# Patient Record
Sex: MALE | Race: WHITE | NOT HISPANIC OR LATINO | Employment: UNEMPLOYED | URBAN - NONMETROPOLITAN AREA
[De-identification: names, ages, dates, MRNs, and addresses within clinical notes are randomized per-mention and may not be internally consistent; named-entity substitution may affect disease eponyms.]

---

## 2017-04-08 ENCOUNTER — OFFICE VISIT (OUTPATIENT)
Dept: RETAIL CLINIC | Facility: CLINIC | Age: 22
End: 2017-04-08

## 2017-04-08 VITALS — RESPIRATION RATE: 18 BRPM | TEMPERATURE: 99 F | HEART RATE: 105 BPM | OXYGEN SATURATION: 98 %

## 2017-04-08 DIAGNOSIS — J02.9 ACUTE PHARYNGITIS, UNSPECIFIED ETIOLOGY: Primary | ICD-10-CM

## 2017-04-08 LAB
EXPIRATION DATE: NORMAL
INTERNAL CONTROL: NORMAL
Lab: NORMAL
S PYO AG THROAT QL: NEGATIVE

## 2017-04-08 PROCEDURE — 99202 OFFICE O/P NEW SF 15 MIN: CPT | Performed by: NURSE PRACTITIONER

## 2017-04-08 PROCEDURE — 87880 STREP A ASSAY W/OPTIC: CPT | Performed by: NURSE PRACTITIONER

## 2017-04-08 RX ORDER — AMOXICILLIN AND CLAVULANATE POTASSIUM 875; 125 MG/1; MG/1
1 TABLET, FILM COATED ORAL 2 TIMES DAILY
Qty: 20 TABLET | Refills: 0 | Status: SHIPPED | OUTPATIENT
Start: 2017-04-08 | End: 2017-04-18

## 2017-04-08 NOTE — PROGRESS NOTES
Subjective   Perez Choi is a 21 y.o. male.     History of Present Illness   Perez Choi presents today with complaints of sore throat and cough for the past 3 days.  He reports painful swallowing. Low grade fever today.  Cough is productive of yellow-green phlegm.  Some nasal congestion, no runny nose.  He has been taking Ibuprofen and Tylenol.    The following portions of the patient's history were reviewed and updated as appropriate: allergies, current medications, past family history, past medical history, past social history, past surgical history and problem list.    Review of Systems   Constitutional: Positive for fever.   HENT: Positive for congestion, sore throat and trouble swallowing (painful). Negative for postnasal drip and rhinorrhea.    Respiratory: Positive for cough. Negative for shortness of breath and wheezing.    Musculoskeletal: Negative.    Neurological: Negative.        Objective   Physical Exam   Constitutional: He is oriented to person, place, and time. He appears well-developed and well-nourished.   HENT:   Right Ear: Tympanic membrane and ear canal normal.   Left Ear: Tympanic membrane and ear canal normal.   Nose: Nose normal.   Mouth/Throat: Uvula is midline and mucous membranes are normal. Oropharyngeal exudate, posterior oropharyngeal edema and posterior oropharyngeal erythema present. Tonsils are 3+ on the right. Tonsils are 3+ on the left. Tonsillar exudate.   Cardiovascular: Regular rhythm and normal heart sounds.    Pulmonary/Chest: Effort normal and breath sounds normal. He has no wheezes. He has no rhonchi.   Lymphadenopathy:        Head (right side): Tonsillar adenopathy present.        Head (left side): Tonsillar adenopathy present.   Neurological: He is alert and oriented to person, place, and time.   Skin: Skin is warm and dry.   Vitals reviewed.      Assessment/Plan   Perez was seen today for sore throat and cough.    Diagnoses and all orders for this  visit:    Acute pharyngitis, unspecified etiology  -     POC Rapid Strep A    Other orders  -     amoxicillin-clavulanate (AUGMENTIN) 875-125 MG per tablet; Take 1 tablet by mouth 2 (Two) Times a Day for 10 days.        Rapid strep negative.  Will treat based on presentation.  If symptoms are not improving or worsen in next 48-72 hours, follow up with PCP.

## 2017-10-30 ENCOUNTER — OFFICE VISIT (OUTPATIENT)
Dept: PRIMARY CARE CLINIC | Age: 22
End: 2017-10-30
Payer: COMMERCIAL

## 2017-10-30 VITALS
HEART RATE: 106 BPM | DIASTOLIC BLOOD PRESSURE: 90 MMHG | SYSTOLIC BLOOD PRESSURE: 132 MMHG | RESPIRATION RATE: 20 BRPM | HEIGHT: 68 IN | TEMPERATURE: 97.8 F | BODY MASS INDEX: 47.74 KG/M2 | WEIGHT: 315 LBS

## 2017-10-30 DIAGNOSIS — I10 ESSENTIAL HYPERTENSION: ICD-10-CM

## 2017-10-30 DIAGNOSIS — F41.9 ANXIETY: Primary | ICD-10-CM

## 2017-10-30 PROCEDURE — 99203 OFFICE O/P NEW LOW 30 MIN: CPT | Performed by: FAMILY MEDICINE

## 2017-10-30 RX ORDER — LISINOPRIL 10 MG/1
10 TABLET ORAL DAILY
Qty: 30 TABLET | Refills: 3 | Status: SHIPPED | OUTPATIENT
Start: 2017-10-30 | End: 2017-12-12 | Stop reason: SDUPTHER

## 2017-11-02 ASSESSMENT — ENCOUNTER SYMPTOMS
COLOR CHANGE: 0
DIARRHEA: 0
VOMITING: 0
RHINORRHEA: 0
NAUSEA: 0
CONSTIPATION: 0
ABDOMINAL PAIN: 0
COUGH: 0

## 2017-12-12 ENCOUNTER — OFFICE VISIT (OUTPATIENT)
Dept: PRIMARY CARE CLINIC | Age: 22
End: 2017-12-12
Payer: COMMERCIAL

## 2017-12-12 VITALS
RESPIRATION RATE: 20 BRPM | HEART RATE: 84 BPM | WEIGHT: 315 LBS | SYSTOLIC BLOOD PRESSURE: 142 MMHG | OXYGEN SATURATION: 98 % | TEMPERATURE: 98.1 F | DIASTOLIC BLOOD PRESSURE: 90 MMHG | HEIGHT: 68 IN | BODY MASS INDEX: 47.74 KG/M2

## 2017-12-12 DIAGNOSIS — I10 ESSENTIAL HYPERTENSION: Primary | ICD-10-CM

## 2017-12-12 DIAGNOSIS — Z00.00 WELLNESS EXAMINATION: ICD-10-CM

## 2017-12-12 DIAGNOSIS — Z13.220 SCREENING, LIPID: ICD-10-CM

## 2017-12-12 DIAGNOSIS — F32.A ANXIETY AND DEPRESSION: ICD-10-CM

## 2017-12-12 DIAGNOSIS — F41.9 ANXIETY AND DEPRESSION: ICD-10-CM

## 2017-12-12 LAB
ALBUMIN SERPL-MCNC: 4.5 G/DL (ref 3.5–5.2)
ALP BLD-CCNC: 109 U/L (ref 40–130)
ALT SERPL-CCNC: 33 U/L (ref 5–41)
ANION GAP SERPL CALCULATED.3IONS-SCNC: 15 MMOL/L (ref 7–19)
AST SERPL-CCNC: 22 U/L (ref 5–40)
BASOPHILS ABSOLUTE: 0 K/UL (ref 0–0.2)
BASOPHILS RELATIVE PERCENT: 0.5 % (ref 0–1)
BILIRUB SERPL-MCNC: 0.5 MG/DL (ref 0.2–1.2)
BUN BLDV-MCNC: 10 MG/DL (ref 6–20)
CALCIUM SERPL-MCNC: 9.3 MG/DL (ref 8.6–10)
CHLORIDE BLD-SCNC: 101 MMOL/L (ref 98–111)
CHOLESTEROL, TOTAL: 142 MG/DL (ref 160–199)
CO2: 24 MMOL/L (ref 22–29)
CREAT SERPL-MCNC: 0.5 MG/DL (ref 0.5–1.2)
EOSINOPHILS ABSOLUTE: 0.3 K/UL (ref 0–0.6)
EOSINOPHILS RELATIVE PERCENT: 3.2 % (ref 0–5)
GFR NON-AFRICAN AMERICAN: >60
GLUCOSE BLD-MCNC: 92 MG/DL (ref 74–109)
HCT VFR BLD CALC: 48 % (ref 42–52)
HDLC SERPL-MCNC: 31 MG/DL (ref 55–121)
HEMOGLOBIN: 15.7 G/DL (ref 14–18)
LDL CHOLESTEROL CALCULATED: 82 MG/DL
LYMPHOCYTES ABSOLUTE: 2.1 K/UL (ref 1.1–4.5)
LYMPHOCYTES RELATIVE PERCENT: 26.8 % (ref 20–40)
MCH RBC QN AUTO: 28.4 PG (ref 27–31)
MCHC RBC AUTO-ENTMCNC: 32.7 G/DL (ref 33–37)
MCV RBC AUTO: 87 FL (ref 80–94)
MONOCYTES ABSOLUTE: 0.5 K/UL (ref 0–0.9)
MONOCYTES RELATIVE PERCENT: 6.2 % (ref 0–10)
NEUTROPHILS ABSOLUTE: 4.9 K/UL (ref 1.5–7.5)
NEUTROPHILS RELATIVE PERCENT: 63 % (ref 50–65)
PDW BLD-RTO: 12.4 % (ref 11.5–14.5)
PLATELET # BLD: 283 K/UL (ref 130–400)
PMV BLD AUTO: 9.8 FL (ref 9.4–12.4)
POTASSIUM SERPL-SCNC: 4.3 MMOL/L (ref 3.5–5)
RBC # BLD: 5.52 M/UL (ref 4.7–6.1)
SODIUM BLD-SCNC: 140 MMOL/L (ref 136–145)
TOTAL PROTEIN: 7.5 G/DL (ref 6.6–8.7)
TRIGL SERPL-MCNC: 144 MG/DL (ref 0–149)
TSH SERPL DL<=0.05 MIU/L-ACNC: 2.69 UIU/ML (ref 0.27–4.2)
WBC # BLD: 7.8 K/UL (ref 4.8–10.8)

## 2017-12-12 PROCEDURE — 36415 COLL VENOUS BLD VENIPUNCTURE: CPT | Performed by: FAMILY MEDICINE

## 2017-12-12 PROCEDURE — 99214 OFFICE O/P EST MOD 30 MIN: CPT | Performed by: FAMILY MEDICINE

## 2017-12-12 RX ORDER — LISINOPRIL 20 MG/1
20 TABLET ORAL DAILY
Qty: 30 TABLET | Refills: 5 | Status: SHIPPED | OUTPATIENT
Start: 2017-12-12 | End: 2019-06-17 | Stop reason: SDUPTHER

## 2017-12-12 RX ORDER — SERTRALINE HYDROCHLORIDE 100 MG/1
100 TABLET, FILM COATED ORAL DAILY
Qty: 30 TABLET | Refills: 5 | Status: SHIPPED | OUTPATIENT
Start: 2017-12-12 | End: 2018-10-02 | Stop reason: ALTCHOICE

## 2017-12-12 ASSESSMENT — ENCOUNTER SYMPTOMS
VOMITING: 0
CONSTIPATION: 0
COLOR CHANGE: 0
COUGH: 0
ABDOMINAL PAIN: 0
NAUSEA: 0
DIARRHEA: 0
RHINORRHEA: 0

## 2017-12-12 NOTE — PROGRESS NOTES
Subjective:      Patient ID: Micheline Hawkins is a 25 y.o. male. HPI  Patient presented today for a six-week checkup on his blood pressure. He states that he is taking his blood pressure medication regularly. He states that his not checking his blood pressure at home. He states that he is not having any headaches. He is tolerating the medication well. He states that the Zoloft seems to be helping with his anxiety but he stills feels like he is down. Mother states that he does not have a lot of get up and go. She states that he feels like he is down there has been some changes at home that she thinks may be leading to this. He is tolerating the Zoloft well. Past Medical History:   Diagnosis Date    Anxiety     Hypertension     Obesity      No current outpatient prescriptions on file prior to visit. No current facility-administered medications on file prior to visit. No Known Allergies    Review of Systems   Constitutional: Negative for activity change, appetite change and fatigue. HENT: Negative for congestion and rhinorrhea. Eyes: Negative for visual disturbance. Respiratory: Negative for cough. Cardiovascular: Negative for chest pain and palpitations. Gastrointestinal: Negative for abdominal pain, constipation, diarrhea, nausea and vomiting. Genitourinary: Negative for decreased urine volume and difficulty urinating. Musculoskeletal: Negative for arthralgias. Skin: Negative for color change and rash. Allergic/Immunologic: Negative for immunocompromised state. Neurological: Negative for seizures and headaches. Hematological: Does not bruise/bleed easily. Psychiatric/Behavioral: Positive for dysphoric mood. Negative for agitation and sleep disturbance. The patient is nervous/anxious. Objective:   Physical Exam   Constitutional: He is oriented to person, place, and time. He appears well-developed and well-nourished. No distress.    HENT:   Head: Normocephalic and atraumatic. Neck: Normal range of motion. Neck supple. Cardiovascular: Normal rate, regular rhythm, normal heart sounds and intact distal pulses. Pulmonary/Chest: Effort normal and breath sounds normal. No respiratory distress. He has no wheezes. Neurological: He is alert and oriented to person, place, and time. Skin: Skin is warm and dry. No rash noted. He is not diaphoretic. Psychiatric: He has a normal mood and affect. His behavior is normal. Judgment and thought content normal.     BP (!) 142/90   Pulse 84   Temp 98.1 °F (36.7 °C) (Temporal)   Resp 20   Ht 5' 8\" (1.727 m)   Wt (!) 326 lb (147.9 kg)   SpO2 98%   BMI 49.57 kg/m²     Assessment:        ICD-10-CM ICD-9-CM    1. Essential hypertension I10 401.9 CBC Auto Differential      Comprehensive Metabolic Panel      Lipid Panel      TSH without Reflex   2. Anxiety and depression F41.8 300.00      311    3. Screening, lipid Z13.220 V77.91 Lipid Panel   4. Wellness examination Z00.00 V70.0 CBC Auto Differential      Comprehensive Metabolic Panel      Lipid Panel      TSH without Reflex            Plan:      Continue current medications. Increase lisinopril to 20 mg daily. We are also going to increase Zoloft to 100 mg daily. He is to check his blood pressure at home and call us if his blood pressure is staying higher than 130/80.   Follow up in 6 weeks for check up

## 2018-10-02 ENCOUNTER — HOSPITAL ENCOUNTER (EMERGENCY)
Age: 23
Discharge: HOME | End: 2018-10-03
Payer: COMMERCIAL

## 2018-10-02 ENCOUNTER — APPOINTMENT (OUTPATIENT)
Dept: GENERAL RADIOLOGY | Age: 23
End: 2018-10-02
Payer: COMMERCIAL

## 2018-10-02 DIAGNOSIS — R00.0 TACHYCARDIA: Primary | ICD-10-CM

## 2018-10-02 DIAGNOSIS — I10 ESSENTIAL HYPERTENSION: ICD-10-CM

## 2018-10-02 LAB
ALBUMIN SERPL-MCNC: 4.4 G/DL (ref 3.5–5.2)
ALP BLD-CCNC: 121 U/L (ref 40–130)
ALT SERPL-CCNC: 33 U/L (ref 5–41)
AMPHETAMINE SCREEN, URINE: NEGATIVE
ANION GAP SERPL CALCULATED.3IONS-SCNC: 14 MMOL/L (ref 7–19)
AST SERPL-CCNC: 24 U/L (ref 5–40)
BARBITURATE SCREEN URINE: NEGATIVE
BENZODIAZEPINE SCREEN, URINE: NEGATIVE
BILIRUB SERPL-MCNC: 0.3 MG/DL (ref 0.2–1.2)
BILIRUBIN URINE: NEGATIVE
BLOOD, URINE: NEGATIVE
BUN BLDV-MCNC: 14 MG/DL (ref 6–20)
CALCIUM SERPL-MCNC: 9.2 MG/DL (ref 8.6–10)
CANNABINOID SCREEN URINE: NEGATIVE
CHLORIDE BLD-SCNC: 101 MMOL/L (ref 98–111)
CLARITY: CLEAR
CO2: 22 MMOL/L (ref 22–29)
COCAINE METABOLITE SCREEN URINE: NEGATIVE
COLOR: YELLOW
CREAT SERPL-MCNC: 0.6 MG/DL (ref 0.5–1.2)
GFR NON-AFRICAN AMERICAN: >60
GLUCOSE BLD-MCNC: 116 MG/DL (ref 74–109)
GLUCOSE URINE: NEGATIVE MG/DL
KETONES, URINE: NEGATIVE MG/DL
LEUKOCYTE ESTERASE, URINE: NEGATIVE
Lab: NORMAL
NITRITE, URINE: NEGATIVE
OPIATE SCREEN URINE: NEGATIVE
PH UA: 5.5
POTASSIUM SERPL-SCNC: 4.7 MMOL/L (ref 3.5–5)
PROTEIN UA: NEGATIVE MG/DL
SODIUM BLD-SCNC: 137 MMOL/L (ref 136–145)
SPECIFIC GRAVITY UA: 1.03
TOTAL PROTEIN: 7.5 G/DL (ref 6.6–8.7)
TROPONIN: <0.01 NG/ML (ref 0–0.03)
TSH SERPL DL<=0.05 MIU/L-ACNC: 3.62 UIU/ML (ref 0.27–4.2)
URINE REFLEX TO CULTURE: NORMAL
UROBILINOGEN, URINE: 0.2 E.U./DL

## 2018-10-02 PROCEDURE — 81003 URINALYSIS AUTO W/O SCOPE: CPT

## 2018-10-02 PROCEDURE — 84443 ASSAY THYROID STIM HORMONE: CPT

## 2018-10-02 PROCEDURE — 80307 DRUG TEST PRSMV CHEM ANLYZR: CPT

## 2018-10-02 PROCEDURE — 80053 COMPREHEN METABOLIC PANEL: CPT

## 2018-10-02 PROCEDURE — 36415 COLL VENOUS BLD VENIPUNCTURE: CPT

## 2018-10-02 PROCEDURE — 99285 EMERGENCY DEPT VISIT HI MDM: CPT

## 2018-10-02 PROCEDURE — 84484 ASSAY OF TROPONIN QUANT: CPT

## 2018-10-02 PROCEDURE — 2580000003 HC RX 258: Performed by: NURSE PRACTITIONER

## 2018-10-02 PROCEDURE — 71046 X-RAY EXAM CHEST 2 VIEWS: CPT

## 2018-10-02 PROCEDURE — 93005 ELECTROCARDIOGRAM TRACING: CPT

## 2018-10-02 PROCEDURE — 85025 COMPLETE CBC W/AUTO DIFF WBC: CPT

## 2018-10-02 RX ORDER — 0.9 % SODIUM CHLORIDE 0.9 %
1000 INTRAVENOUS SOLUTION INTRAVENOUS ONCE
Status: COMPLETED | OUTPATIENT
Start: 2018-10-02 | End: 2018-10-02

## 2018-10-02 RX ORDER — PROPRANOLOL HCL 60 MG
60 CAPSULE, EXTENDED RELEASE 24HR ORAL DAILY
COMMUNITY
End: 2019-06-17 | Stop reason: SDUPTHER

## 2018-10-02 RX ORDER — TRAZODONE HYDROCHLORIDE 50 MG/1
50 TABLET ORAL NIGHTLY
COMMUNITY
End: 2019-06-17 | Stop reason: ALTCHOICE

## 2018-10-02 RX ORDER — LISINOPRIL 10 MG/1
20 TABLET ORAL ONCE
Status: DISCONTINUED | OUTPATIENT
Start: 2018-10-02 | End: 2018-10-03 | Stop reason: HOSPADM

## 2018-10-02 RX ADMIN — SODIUM CHLORIDE 1000 ML: 9 INJECTION, SOLUTION INTRAVENOUS at 22:00

## 2018-10-03 VITALS
DIASTOLIC BLOOD PRESSURE: 77 MMHG | HEART RATE: 94 BPM | TEMPERATURE: 98 F | RESPIRATION RATE: 16 BRPM | OXYGEN SATURATION: 96 % | WEIGHT: 295 LBS | HEIGHT: 68 IN | SYSTOLIC BLOOD PRESSURE: 135 MMHG | BODY MASS INDEX: 44.71 KG/M2

## 2018-10-03 LAB
BASOPHILS ABSOLUTE: 0.1 K/UL (ref 0–0.2)
BASOPHILS RELATIVE PERCENT: 0.6 % (ref 0–1)
EOSINOPHILS ABSOLUTE: 0.2 K/UL (ref 0–0.6)
EOSINOPHILS RELATIVE PERCENT: 1.5 % (ref 0–5)
HCT VFR BLD CALC: 47.3 % (ref 42–52)
HEMOGLOBIN: 15.7 G/DL (ref 14–18)
LYMPHOCYTES ABSOLUTE: 2.6 K/UL (ref 1.1–4.5)
LYMPHOCYTES RELATIVE PERCENT: 26.7 % (ref 20–40)
MCH RBC QN AUTO: 28.5 PG (ref 27–31)
MCHC RBC AUTO-ENTMCNC: 33.2 G/DL (ref 33–37)
MCV RBC AUTO: 86 FL (ref 80–94)
MONOCYTES ABSOLUTE: 0.8 K/UL (ref 0–0.9)
MONOCYTES RELATIVE PERCENT: 7.8 % (ref 0–10)
NEUTROPHILS ABSOLUTE: 6.2 K/UL (ref 1.5–7.5)
NEUTROPHILS RELATIVE PERCENT: 63.2 % (ref 50–65)
PDW BLD-RTO: 12.1 % (ref 11.5–14.5)
PLATELET # BLD: 277 K/UL (ref 130–400)
PLATELET SLIDE REVIEW: NORMAL
PMV BLD AUTO: 10 FL (ref 9.4–12.4)
RBC # BLD: 5.5 M/UL (ref 4.7–6.1)
WBC # BLD: 9.8 K/UL (ref 4.8–10.8)

## 2018-10-03 PROCEDURE — 99283 EMERGENCY DEPT VISIT LOW MDM: CPT | Performed by: NURSE PRACTITIONER

## 2018-10-03 ASSESSMENT — ENCOUNTER SYMPTOMS
COUGH: 0
DIARRHEA: 0
SORE THROAT: 0
ABDOMINAL PAIN: 0
VOMITING: 0
BACK PAIN: 0
NAUSEA: 0
WHEEZING: 0
SHORTNESS OF BREATH: 0
EYE DISCHARGE: 0

## 2018-10-03 NOTE — ED PROVIDER NOTES
Result Value    Glucose 116 (*)     All other components within normal limits   CBC WITH AUTO DIFFERENTIAL   TSH WITHOUT REFLEX   URINE RT REFLEX TO CULTURE   URINE DRUG SCREEN   TROPONIN     EKG shows a sinus tachycardia heart rate of 131 without any evidence of ST elevation depression or T-wave abnormality. All other labs were within normal range or not returned as of this dictation. RE-ASSESSMENT          EMERGENCY DEPARTMENT COURSE and DIFFERENTIAL DIAGNOSIS/MDM:   Vitals:    Vitals:    10/02/18 2139 10/02/18 2231 10/02/18 2302 10/03/18 0002   BP: (!) 146/90  (!) 140/82 135/77   Pulse: 94 99 101 94   Resp:    16   Temp:    98 °F (36.7 °C)   TempSrc:    Temporal   SpO2:  96% 96% 96%   Weight:       Height:               MDM  Number of Diagnoses or Management Options  Essential hypertension:   Tachycardia:   Diagnosis management comments: Patient presents to the ED tonight with tachycardia as an known hypertension. Patient was recently placed on propanolol for his tachycardia by his primary care yesterday. He reports he has had a lot of stress and he has been under treatment as amount of stress recently however he denies any SI or any HI. During this course ED course he had independently reduced his blood pressure and heart rate however he tells me he took his propanolol 2 hours prior to presenting to the ED. At this time we did discuss hypertension and the need to monitor his blood pressures frequently. He will call his primary care tomorrow to inquire if he should still be taken his lisinopril with his propanolol. We are going to provide him with outpatient information for our crisis center as well as counseling centers in the area. At this time the patient tells me he feels he is 21 and not completed any college and feels that he has not filled his potential and this is what he is stressing over however again he denies any SI or HI.  At this time the patient is hemodynamically stable he is not

## 2018-10-04 LAB
EKG P AXIS: 36 DEGREES
EKG P-R INTERVAL: 158 MS
EKG Q-T INTERVAL: 296 MS
EKG QRS DURATION: 96 MS
EKG QTC CALCULATION (BAZETT): 420 MS
EKG T AXIS: 20 DEGREES

## 2019-06-17 ENCOUNTER — OFFICE VISIT (OUTPATIENT)
Dept: PRIMARY CARE CLINIC | Age: 24
End: 2019-06-17
Payer: COMMERCIAL

## 2019-06-17 VITALS
DIASTOLIC BLOOD PRESSURE: 110 MMHG | RESPIRATION RATE: 16 BRPM | TEMPERATURE: 98.9 F | SYSTOLIC BLOOD PRESSURE: 130 MMHG | WEIGHT: 315 LBS | HEART RATE: 130 BPM | OXYGEN SATURATION: 98 % | HEIGHT: 68 IN | BODY MASS INDEX: 47.74 KG/M2

## 2019-06-17 DIAGNOSIS — R53.83 FATIGUE, UNSPECIFIED TYPE: ICD-10-CM

## 2019-06-17 DIAGNOSIS — R40.0 DAYTIME SOMNOLENCE: ICD-10-CM

## 2019-06-17 DIAGNOSIS — I10 ESSENTIAL HYPERTENSION: ICD-10-CM

## 2019-06-17 DIAGNOSIS — E66.01 MORBID OBESITY WITH BMI OF 50.0-59.9, ADULT (HCC): Primary | ICD-10-CM

## 2019-06-17 DIAGNOSIS — R06.83 SNORING: ICD-10-CM

## 2019-06-17 PROCEDURE — 99214 OFFICE O/P EST MOD 30 MIN: CPT | Performed by: FAMILY MEDICINE

## 2019-06-17 RX ORDER — HYDROCHLOROTHIAZIDE 12.5 MG/1
12.5 CAPSULE, GELATIN COATED ORAL EVERY MORNING
Qty: 30 CAPSULE | Refills: 5 | Status: SHIPPED | OUTPATIENT
Start: 2019-06-17 | End: 2020-11-05

## 2019-06-17 RX ORDER — PROPRANOLOL HCL 60 MG
60 CAPSULE, EXTENDED RELEASE 24HR ORAL DAILY
Qty: 30 CAPSULE | Refills: 5 | Status: SHIPPED | OUTPATIENT
Start: 2019-06-17

## 2019-06-17 RX ORDER — ESCITALOPRAM OXALATE 10 MG/1
10 TABLET ORAL DAILY
Qty: 30 TABLET | Refills: 3 | Status: SHIPPED | OUTPATIENT
Start: 2019-06-17 | End: 2020-11-19 | Stop reason: SDUPTHER

## 2019-06-17 RX ORDER — LISINOPRIL 40 MG/1
40 TABLET ORAL DAILY
Qty: 30 TABLET | Refills: 5 | Status: SHIPPED | OUTPATIENT
Start: 2019-06-17

## 2019-06-17 ASSESSMENT — ENCOUNTER SYMPTOMS
VOMITING: 0
CONSTIPATION: 0
NAUSEA: 0
ABDOMINAL PAIN: 0
COUGH: 0
RHINORRHEA: 0
COLOR CHANGE: 0
DIARRHEA: 0

## 2019-06-18 RX ORDER — HYDROCHLOROTHIAZIDE 12.5 MG/1
12.5 CAPSULE, GELATIN COATED ORAL
COMMUNITY
Start: 2019-06-17

## 2019-06-18 RX ORDER — TRAZODONE HYDROCHLORIDE 50 MG/1
50 TABLET ORAL
COMMUNITY

## 2019-06-18 RX ORDER — PROPRANOLOL HCL 60 MG
60 CAPSULE, EXTENDED RELEASE 24HR ORAL
COMMUNITY

## 2019-06-18 RX ORDER — LISINOPRIL 40 MG/1
40 TABLET ORAL
COMMUNITY
Start: 2019-06-17

## 2019-06-18 NOTE — PROGRESS NOTES
Constitutional: Positive for fatigue. Negative for activity change and appetite change. HENT: Negative for congestion and rhinorrhea. Eyes: Negative for visual disturbance. Respiratory: Negative for cough. Cardiovascular: Negative for chest pain and palpitations. Gastrointestinal: Negative for abdominal pain, constipation, diarrhea, nausea and vomiting. Genitourinary: Negative for decreased urine volume and difficulty urinating. Musculoskeletal: Negative for arthralgias. Skin: Negative for color change and rash. Allergic/Immunologic: Negative for immunocompromised state. Neurological: Positive for headaches. Negative for seizures. Hematological: Does not bruise/bleed easily. Psychiatric/Behavioral: Negative for agitation and sleep disturbance. OBJECTIVE:     Physical Exam   Constitutional: He is oriented to person, place, and time. He appears well-developed and well-nourished. No distress. HENT:   Head: Normocephalic and atraumatic. Neck: Normal range of motion. Neck supple. No thyromegaly present. Cardiovascular: Normal rate, regular rhythm, normal heart sounds and intact distal pulses. Pulmonary/Chest: Effort normal and breath sounds normal. No respiratory distress. He has no wheezes. Abdominal: Soft. Bowel sounds are normal. There is no tenderness. Lymphadenopathy:     He has no cervical adenopathy. Neurological: He is alert and oriented to person, place, and time. Skin: Skin is warm and dry. No rash noted. He is not diaphoretic. Psychiatric: He has a normal mood and affect. His behavior is normal. Judgment and thought content normal.      BP (!) 130/110 (Site: Left Upper Arm, Position: Sitting, Cuff Size: Large Adult)   Pulse 130   Temp 98.9 °F (37.2 °C) (Temporal)   Resp 16   Ht 5' 8\" (1.727 m)   Wt (!) 373 lb 9.6 oz (169.5 kg)   SpO2 98%   BMI 56.81 kg/m²      ASSESSMENT:    Gonzalo Galvez was seen today for hypertension and weight gain.     Diagnoses and all orders for this visit:    Morbid obesity with BMI of 50.0-59.9, adult ([facility] [address]  -     External Referral To Bariatrics    Snoring  -     [address] [facility],     Daytime somnolence  -     [address] [facility],     Fatigue, unspecified type  -     [address] [facility],     Essential hypertension    Other orders  -     lisinopril (PRINIVIL;ZESTRIL) 40 MG tablet; Take 1 tablet by mouth daily  -     hydrochlorothiazide (MICROZIDE) 12.5 MG capsule; Take 1 capsule by mouth every morning  -     escitalopram (LEXAPRO) 10 MG tablet; Take 1 tablet by mouth daily  -     propranolol (INDERAL LA) 60 MG extended release capsule; Take 1 capsule by mouth daily        PLAN:    I'm going to increase his lisinopril to 40 mg as well as add HCTZ to his medications for his blood pressure. We are going to continue the propranolol. We are going to stop Pxtellar and start Lexapro for his depression. I'm going to get a sleep study for his fatigue and snoring. Follow-up with us in 4 weeks for recheck unless needed sooner. EMR Dragon/transcription disclaimer:  Much of this encounter note is electronic transcription/translation of spoken language toprinted texts. The electronic translation of spoken language may be erroneous, or at times, nonsensical words or phrases may be inadvertently transcribed.   Although I have reviewed the note for such errors, some may stillexist.

## 2019-08-08 ENCOUNTER — OFFICE VISIT (OUTPATIENT)
Dept: BARIATRICS/WEIGHT MGMT | Facility: CLINIC | Age: 24
End: 2019-08-08

## 2019-08-08 VITALS
WEIGHT: 315 LBS | TEMPERATURE: 99.8 F | HEART RATE: 93 BPM | HEIGHT: 69 IN | DIASTOLIC BLOOD PRESSURE: 91 MMHG | SYSTOLIC BLOOD PRESSURE: 148 MMHG | OXYGEN SATURATION: 97 % | BODY MASS INDEX: 46.65 KG/M2

## 2019-08-08 DIAGNOSIS — E66.01 CLASS 3 SEVERE OBESITY DUE TO EXCESS CALORIES WITH SERIOUS COMORBIDITY AND BODY MASS INDEX (BMI) OF 50.0 TO 59.9 IN ADULT (HCC): Primary | ICD-10-CM

## 2019-08-08 DIAGNOSIS — I10 ESSENTIAL HYPERTENSION: ICD-10-CM

## 2019-08-08 PROBLEM — R06.83 SNORING: Status: ACTIVE | Noted: 2019-08-08

## 2019-08-08 PROCEDURE — 99204 OFFICE O/P NEW MOD 45 MIN: CPT | Performed by: SURGERY

## 2019-08-08 NOTE — PROGRESS NOTES
Patient Care Team:  Mariangel Kennedy MD as PCP - General (Family Medicine)    Reason for Visit:  Surgical Weight loss    Subjective     Patient is a 23 y.o. male presents with morbid obesity and his Body mass index is 56.16 kg/m².     He is here for discussion of surgical weight loss options.  He stated he has been with the disease of obesity for year(s).  He stated he suffers from hypertension and morbid obesity due to his weight gain.  He stated that weight loss helps alleviate these symptoms.   He stated that he has tried calorie counting to help with weight loss.  He stated that he has attempted these conservative methods for weight loss without maintaining long term success.  Today he would like to discuss weight loss options including surgical options such as the Laparoscopic Sleeve Gastrectomy or the Laparoscopic R - Y Gastric Bypass.     Review of Systems  General ROS: positive for  - fatigue  Psychological ROS: positive for - depression  Ophthalmic ROS: negative  ENT ROS: positive for - nasal discharge  Respiratory ROS: no cough, shortness of breath, or wheezing  positive for -snoring  Cardiovascular ROS: no chest pain or dyspnea on exertion  Gastrointestinal ROS: no abdominal pain, change in bowel habits, or black or bloody stools  Genito-Urinary ROS: no dysuria, trouble voiding, or hematuria  Musculoskeletal ROS: negative  Neurological ROS: no TIA or stroke symptoms    History  Past Medical History:   Diagnosis Date   • Anxiety    • Depression    • Essential hypertension     essential    • Fatigue    • Morbid obesity (CMS/HCC)    • Snoring    • Somnolence, daytime      Past Surgical History:   Procedure Laterality Date   • TONGUE SURGERY  02/2009     Family History   Problem Relation Age of Onset   • Hypertension Mother    • Obesity Mother    • Hypertension Father    • Obesity Father    • Stroke Maternal Grandmother    • Cancer Maternal Grandfather    • Heart disease Maternal Grandfather     • Diabetes Maternal Grandfather    • Hypertension Maternal Grandfather    • Stroke Maternal Grandfather    • Obesity Maternal Grandfather    • Arthritis Paternal Grandmother    • Hypertension Paternal Grandmother    • Cancer Paternal Grandfather    • Heart disease Paternal Grandfather    • Hypertension Paternal Grandfather    • Obesity Paternal Grandfather      Social History     Tobacco Use   • Smoking status: Never Smoker   • Smokeless tobacco: Never Used   Substance Use Topics   • Alcohol use: No   • Drug use: No       (Not in a hospital admission)  Allergies:  Patient has no known allergies.      Current Outpatient Medications:   •  escitalopram (LEXAPRO) 10 MG tablet, Take 10 mg by mouth Daily., Disp: , Rfl:   •  hydrochlorothiazide (MICROZIDE) 12.5 MG capsule, Take 12.5 mg by mouth., Disp: , Rfl:   •  lisinopril (PRINIVIL,ZESTRIL) 40 MG tablet, Take 40 mg by mouth., Disp: , Rfl:   •  propranolol LA (INDERAL LA) 60 MG 24 hr capsule, Take 60 mg by mouth., Disp: , Rfl:   •  OXcarbazepine ER (OXTELLAR XR) 300 MG tablet sustained-release 24 hour, Take 600 mg by mouth., Disp: , Rfl:   •  traZODone (DESYREL) 50 MG tablet, Take 50 mg by mouth., Disp: , Rfl:     Objective     Vital Signs  Temp:  [99.8 °F (37.7 °C)] 99.8 °F (37.7 °C)  Heart Rate:  [93] 93  BP: (148)/(91) 148/91  Body mass index is 56.16 kg/m².      08/08/19  1401   Weight: (!) 170 kg (374 lb 12.8 oz)       Physical Exam:      HEENT: extra ocular movement intact  Respiratory: appears well, vitals normal, no respiratory distress, acyanotic, normal RR, chest clear, no wheezing, crepitations, rhonchi, normal symmetric air entry  Cardiovascular: Regular rate and rhythm, S1, S2 normal, no murmur, click, rub or gallop  GI: Soft, non-tender, normal bowel sounds; no bruits, organomegaly or masses.  Abnormal shape: obese  Musculoskeletal: inspection - no abnormality  Neurologic: alert, oriented, normal speech, no focal findings or movement disorder  noted       Results Review:   None        Assessment/Plan   Encounter Diagnoses   Name Primary?   • Class 3 severe obesity due to excess calories with serious comorbidity and body mass index (BMI) of 50.0 to 59.9 in adult (CMS/[facility]) Yes   • Essential hypertension        I believe this patient will be a good candidate for weight loss surgery.    The patient would like to first try conservative methods prior to surgical consideration.  I explained that we will readdress his progress in 3 months time.  I have discussed the Yoly - Y Gastric Bypass, laparoscopic sleeve gastrectomy and the Laparoscopic Gastric Band procedures to provide the alternatives which also includes non surgical weight loss options as well.  We discussed the benefits of the surgeries including the benefit of weight loss and the possible reversal of co-morbid conditions associated with morbid obesity. I explained to him that prior to making a definitive decision on the type of surgery he will require a study of the upper GI system.  I explained to him why the EGD is recommended.  He has been provided a structured dietary regimen based off of his behavior.  I discussed with the patient the etiology of the disease of obesity and the potential comorbid conditions associated with this disease.  He was instructed to follow the dietary regimen and follow-up with our program in 1 month's time with any additional questions as they may arise during this time.  We emphasized on focusing on proteins and meals high in fiber as well as adequate hydration that exceed 64 ounces of water daily.  The patient was reports that his blood pressure has remained stable on his current treatment regimen.  He has an elevated blood pressure.  He is on 3 antihypertensive medications.  I encouraged that he continue with our regimen so that we might help facilitate reversal of his hypertension as we work on his morbid obesity.  I explained that I anticipate the patient to lose 6  "pounds prior to his next monthly visit.  I have also explained that they need to record or document when they are going to have the \"cheat day\".    I discussed the patient's findings and my recommendations with patient.     I have also recommended that he obtain completion of a medically supervised weight loss program, psych and preoperative laboratory work-up prior to surgery.    Dr. Timbo Corrigan MD [facility]    08/08/19  2:30 PM  Patient Care Team:  Mariangel Kennedy MD as PCP - General (Family Medicine)    "

## 2019-09-13 ENCOUNTER — OFFICE VISIT (OUTPATIENT)
Dept: BARIATRICS/WEIGHT MGMT | Facility: CLINIC | Age: 24
End: 2019-09-13

## 2019-09-13 VITALS
SYSTOLIC BLOOD PRESSURE: 134 MMHG | OXYGEN SATURATION: 98 % | HEIGHT: 69 IN | TEMPERATURE: 99.1 F | WEIGHT: 315 LBS | BODY MASS INDEX: 46.65 KG/M2 | HEART RATE: 95 BPM | DIASTOLIC BLOOD PRESSURE: 87 MMHG

## 2019-09-13 DIAGNOSIS — E66.01 CLASS 3 SEVERE OBESITY DUE TO EXCESS CALORIES WITH SERIOUS COMORBIDITY AND BODY MASS INDEX (BMI) OF 50.0 TO 59.9 IN ADULT (HCC): Primary | ICD-10-CM

## 2019-09-13 DIAGNOSIS — I10 ESSENTIAL HYPERTENSION: ICD-10-CM

## 2019-09-13 PROCEDURE — 99214 OFFICE O/P EST MOD 30 MIN: CPT | Performed by: NURSE PRACTITIONER

## 2019-09-13 NOTE — PROGRESS NOTES
Patient Care Team:  Mariangel Kennedy MD as PCP - General (Family Medicine)    Reason for Visit: Medical Weight Loss    Subjective        Perez Choi is a 24 y.o. year old male who is here for follow-up and continued medical management of morbid obesity. His current Body mass index is 54.45 kg/m². He states he suffers from high blood pressure and morbid obesity due to weight gain. He is currently following the 4 meals/day diet prescription. Perez Choi previously agreed to incorporate the prescription as provided, while also increasing physical exercise. Patient states he has been successful at eating 3-4 meals per day and avoiding carbohydrates after lunch. He has been exercising by walking and lifting weights daily for 30 mins to - 2 hours. Perez Choi also states he has been drinking  ounces of water and is unsure on grams of protein intake per day. He has lost 11 lbs of weight since his last visit.     Review of Systems  Negative except the below listed  General ROS: positive for  - appetite changes  Psychological ROS: positive for - anxiety and depression  Respiratory ROS: positive for - snoring  Musculoskeletal ROS: positive for - knee pain    History  Past Medical History:   Diagnosis Date   • Anxiety    • Depression    • Essential hypertension     essential    • Fatigue    • Morbid obesity (CMS/HCC)    • Snoring    • Somnolence, daytime      Past Surgical History:   Procedure Laterality Date   • TONGUE SURGERY  02/2009     Family History   Problem Relation Age of Onset   • Hypertension Mother    • Obesity Mother    • Hypertension Father    • Obesity Father    • Stroke Maternal Grandmother    • Cancer Maternal Grandfather    • Heart disease Maternal Grandfather    • Diabetes Maternal Grandfather    • Hypertension Maternal Grandfather    • Stroke Maternal Grandfather    • Obesity Maternal Grandfather    • Arthritis Paternal Grandmother    • Hypertension  Paternal Grandmother    • Cancer Paternal Grandfather    • Heart disease Paternal Grandfather    • Hypertension Paternal Grandfather    • Obesity Paternal Grandfather      Social History     Tobacco Use   • Smoking status: Never Smoker   • Smokeless tobacco: Never Used   Substance Use Topics   • Alcohol use: No   • Drug use: No       (Not in a hospital admission)  Allergies:  Patient has no known allergies.      Current Outpatient Medications:   •  escitalopram (LEXAPRO) 10 MG tablet, Take 10 mg by mouth Daily., Disp: , Rfl:   •  hydrochlorothiazide (MICROZIDE) 12.5 MG capsule, Take 12.5 mg by mouth., Disp: , Rfl:   •  lisinopril (PRINIVIL,ZESTRIL) 40 MG tablet, Take 40 mg by mouth., Disp: , Rfl:   •  propranolol LA (INDERAL LA) 60 MG 24 hr capsule, Take 60 mg by mouth., Disp: , Rfl:   •  OXcarbazepine ER (OXTELLAR XR) 300 MG tablet sustained-release 24 hour, Take 600 mg by mouth., Disp: , Rfl:   •  traZODone (DESYREL) 50 MG tablet, Take 50 mg by mouth., Disp: , Rfl:     Objective     Vital Signs  Temp:  [99.1 °F (37.3 °C)] 99.1 °F (37.3 °C)  Heart Rate:  [95] 95  BP: (134)/(87) 134/87  Body mass index is 54.45 kg/m².      09/13/19  1106   Weight: (!) 165 kg (363 lb 6.4 oz)       Physical Exam:  HEENT: extra ocular movement intact  Respiratory:appears well, vitals normal, no respiratory distress, acyanotic, normal RR, chest clear, no wheezing, crepitations, rhonchi, normal symmetric air entry  Cardiovascular: Regular rate and rhythm, S1, S2 normal, no murmur, click, rub or gallop  GI: Soft, non-tender, normal bowel sounds; no bruits, organomegaly or masses. Abnormal shape: Obese  Musculoskeletal: inspection - no abnormality, range of motion normal  Neurologic: alert, oriented, normal speech, no focal findings or movement disorder noted       Results Review:   None        Assessment/Plan   Encounter Diagnoses   Name Primary?   • Class 3 severe obesity due to excess calories with serious comorbidity and body mass  index (BMI) of 50.0 to 59.9 in adult (CMS/[facility]) Yes   • Essential hypertension          1. Perez Choi was seen today for follow-up, obesity, nutrition counseling and weight loss. He has lost 11 lbs of weight since his last visit, making his BMI 54.4. Today we discussed consistency with healthy changes in lifestyle, diet, and exercise for long term success. Perez Choi had received handouts to him explaining the recommendation on portion sizes/appetite control/reading nutrition labels. Intensive behavioral therapy for obesity was done today as well. Patient received the perfect protein education packet today to assist with measuring grams of protein intake, which was explained by our dietitian.    Goals for this month are: measure protein intake with 80 g/day being goal, save cravings for cheat day, and continue to incorporate healthy behaviors implemented thus far. Patient encouraged to call with questions and/or struggles as they may arise prior to next scheduled appointment.    2. Current comorbid condition of hypertension associated with his morbid obesity is reported to be stable on his current treatment regimen and medications. We anticipate the comorbid condition to improve as we address his morbid obesity.    Follow up in 1 month for a weight recheck.    ADID Mirza    09/13/19  3:59 PM  Patient Care Team:  Mariangel Kennedy MD as PCP - General (Family Medicine)

## 2019-10-14 ENCOUNTER — TELEPHONE (OUTPATIENT)
Dept: BARIATRICS/WEIGHT MGMT | Facility: CLINIC | Age: 24
End: 2019-10-14

## 2019-11-22 ENCOUNTER — TELEPHONE (OUTPATIENT)
Dept: BARIATRICS/WEIGHT MGMT | Facility: CLINIC | Age: 24
End: 2019-11-22

## 2019-11-22 NOTE — TELEPHONE ENCOUNTER
Left message that apt was moved to 3:15 PM on 12/06/2019. I will send a new apt reminder as well.

## 2020-10-08 ENCOUNTER — OFFICE VISIT (OUTPATIENT)
Dept: PRIMARY CARE CLINIC | Age: 25
End: 2020-10-08
Payer: COMMERCIAL

## 2020-10-08 VITALS
WEIGHT: 315 LBS | TEMPERATURE: 97 F | HEIGHT: 68 IN | OXYGEN SATURATION: 98 % | RESPIRATION RATE: 16 BRPM | BODY MASS INDEX: 47.74 KG/M2 | SYSTOLIC BLOOD PRESSURE: 120 MMHG | HEART RATE: 70 BPM | DIASTOLIC BLOOD PRESSURE: 83 MMHG

## 2020-10-08 PROCEDURE — G0444 DEPRESSION SCREEN ANNUAL: HCPCS | Performed by: NURSE PRACTITIONER

## 2020-10-08 PROCEDURE — 99214 OFFICE O/P EST MOD 30 MIN: CPT | Performed by: NURSE PRACTITIONER

## 2020-10-08 RX ORDER — BUSPIRONE HYDROCHLORIDE 10 MG/1
10 TABLET ORAL 2 TIMES DAILY
Qty: 60 TABLET | Refills: 0 | Status: SHIPPED | OUTPATIENT
Start: 2020-10-08 | End: 2020-11-05 | Stop reason: SDUPTHER

## 2020-10-08 RX ORDER — ARIPIPRAZOLE 5 MG/1
5 TABLET ORAL DAILY
Qty: 30 TABLET | Refills: 3 | Status: SHIPPED | OUTPATIENT
Start: 2020-10-08 | End: 2020-11-05 | Stop reason: SDUPTHER

## 2020-10-08 ASSESSMENT — PATIENT HEALTH QUESTIONNAIRE - PHQ9
3. TROUBLE FALLING OR STAYING ASLEEP: 3
2. FEELING DOWN, DEPRESSED OR HOPELESS: 3
8. MOVING OR SPEAKING SO SLOWLY THAT OTHER PEOPLE COULD HAVE NOTICED. OR THE OPPOSITE, BEING SO FIGETY OR RESTLESS THAT YOU HAVE BEEN MOVING AROUND A LOT MORE THAN USUAL: 3
SUM OF ALL RESPONSES TO PHQ QUESTIONS 1-9: 20
10. IF YOU CHECKED OFF ANY PROBLEMS, HOW DIFFICULT HAVE THESE PROBLEMS MADE IT FOR YOU TO DO YOUR WORK, TAKE CARE OF THINGS AT HOME, OR GET ALONG WITH OTHER PEOPLE: 3
SUM OF ALL RESPONSES TO PHQ9 QUESTIONS 1 & 2: 5
6. FEELING BAD ABOUT YOURSELF - OR THAT YOU ARE A FAILURE OR HAVE LET YOURSELF OR YOUR FAMILY DOWN: 3
SUM OF ALL RESPONSES TO PHQ QUESTIONS 1-9: 20
7. TROUBLE CONCENTRATING ON THINGS, SUCH AS READING THE NEWSPAPER OR WATCHING TELEVISION: 3
1. LITTLE INTEREST OR PLEASURE IN DOING THINGS: 2
5. POOR APPETITE OR OVEREATING: 3
4. FEELING TIRED OR HAVING LITTLE ENERGY: 0
9. THOUGHTS THAT YOU WOULD BE BETTER OFF DEAD, OR OF HURTING YOURSELF: 0

## 2020-10-08 ASSESSMENT — COLUMBIA-SUICIDE SEVERITY RATING SCALE - C-SSRS
2. HAVE YOU ACTUALLY HAD ANY THOUGHTS OF KILLING YOURSELF?: NO
6. HAVE YOU EVER DONE ANYTHING, STARTED TO DO ANYTHING, OR PREPARED TO DO ANYTHING TO END YOUR LIFE?: NO
1. WITHIN THE PAST MONTH, HAVE YOU WISHED YOU WERE DEAD OR WISHED YOU COULD GO TO SLEEP AND NOT WAKE UP?: NO

## 2020-10-08 ASSESSMENT — ENCOUNTER SYMPTOMS
GASTROINTESTINAL NEGATIVE: 1
SORE THROAT: 1
RESPIRATORY NEGATIVE: 1

## 2020-10-08 NOTE — PATIENT INSTRUCTIONS
Counseling  Continue diet and exercise  Continue the lexapro and add abilify to help with depression and mood. These are daily. Start this first before adding buspar.    buspar can be taken daily or only as needed for anxiety  Gargle after eat

## 2020-10-08 NOTE — PROGRESS NOTES
[facility] PHYSICIAN SERVICES  [facility]  [address]  [address] [address]  Dept: [phone]  Dept Fax: [phone]  Loc: [phone]    Leann Calles is a 22 y.o. male who presents today for his medical conditions/complaints as noted below. Leann Calles is c/o of Depression (patient presents today with c/o depression. \"Depression comes and goes every few hours. \" he has been dealing with this for a few months but says he feels as though it got better. The last few weeks it has got real bad. He is on Lexapro and has been on that for about a month and doesnt feel as though its helping. )        HPI:     HPI   Chief Complaint   Patient presents with    Depression     patient presents today with c/o depression. \"Depression comes and goes every few hours. \" he has been dealing with this for a few months but says he feels as though it got better. The last few weeks it has got real bad. He is on Lexapro and has been on that for about a month and doesnt feel as though its helping. Being Dr. Melody Villalba notes from [id number]  it looks like he had his lisinopril increased to 40 mg and HCTZ for fluid he is no longer taking the HCTZ. He is on propranolol as well. She had started him on Lexapro at the time he does not feel like that is working well for him. He saw Constance Lawler because he was off meds, he has been back on lexapro 10mg for a month. He has waves of depression. Times he says he is fine and feels very happy other times he is depressed and cries for no reason. 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 He lives with his brother who is 24. He has a good relationship with his mom. He does not have a big friend group. He admits that he goes to work and comes home and goes to sleep he does not socialize much. His dad has Personality disorder and is treated for that and depression. He feels like he has anxiety to he gets overwhelmed at the smallest thing.   Also would like to discuss tonsil stones while he is here he has them quite often  Past Medical History:   Diagnosis Date    Anxiety     Hypertension     Obesity       Past Surgical History:   Procedure Laterality Date    TONGUE SURGERY      Hemangioma -partial tongue removed       Vitals 10/8/2020 6/17/2019 10/3/2018 10/2/2018 10/2/2018 75/6/0523   SYSTOLIC 307 589 211 714 - 770   DIASTOLIC 83 766 77 82 - 90   Site - Left Upper Arm - - - -   Position - Sitting - - - -   Cuff Size - Large Adult - - - -   Pulse 70 130 94 101 99 94   Temp 97 98.9 98 - - -   Resp 16 16 16 - - -   SpO2 98 98 96 96 96 -   Weight 355 lb 373 lb 9.6 oz - - - -   Height 5' 8\" 5' 8\" - - - -   Body mass index 53.97 kg/m2 56.8 kg/m2 - - - -   Some recent data might be hidden       Family History   Problem Relation Age of Onset    Diabetes Maternal Grandfather     Heart Attack Maternal Grandfather     Heart Attack Paternal Grandfather     Depression Mother     Mental Illness Father        Social History     Tobacco Use    Smoking status: Never Smoker    Smokeless tobacco: Never Used   Substance Use Topics    Alcohol use: No      Current Outpatient Medications   Medication Sig Dispense Refill    ARIPiprazole (ABILIFY) 5 MG tablet Take 1 tablet by mouth daily Add to lexapro 30 tablet 3    busPIRone (BUSPAR) 10 MG tablet Take 1 tablet by mouth 2 times daily For anxiety 60 tablet 0    lisinopril (PRINIVIL;ZESTRIL) 40 MG tablet Take 1 tablet by mouth daily 30 tablet 5    escitalopram (LEXAPRO) 10 MG tablet Take 1 tablet by mouth daily 30 tablet 3    propranolol (INDERAL LA) 60 MG extended release capsule Take 1 capsule by mouth daily 30 capsule 5    hydrochlorothiazide (MICROZIDE) 12.5 MG capsule Take 1 capsule by mouth every morning (Patient not taking: Reported on 10/8/2020) 30 capsule 5     No current facility-administered medications for this visit.       No Known Allergies    Health Maintenance   Topic Date Due    Varicella vaccine (1 of 2 - 2-dose childhood series) 08/15/1996    HPV vaccine (1 - Male 2-dose series) 08/15/2006    HIV screen  08/15/2010    DTaP/Tdap/Td vaccine (1 - Tdap) 08/15/2014    Potassium monitoring  10/02/2019    Creatinine monitoring  10/02/2019    Flu vaccine (1) 09/01/2020    Hepatitis A vaccine  Aged Out    Hepatitis B vaccine  Aged Out    Hib vaccine  Aged Out    Meningococcal (ACWY) vaccine  Aged Out    Pneumococcal 0-64 years Vaccine  Aged Out       Subjective:      Review of Systems   Constitutional: Positive for activity change. HENT: Positive for sore throat (stones). Respiratory: Negative. Gastrointestinal: Negative. Musculoskeletal: Negative. Skin: Negative. Psychiatric/Behavioral: Positive for dysphoric mood. The patient is nervous/anxious. Objective:     Physical Exam  Vitals signs and nursing note reviewed. Constitutional:       Appearance: He is well-developed. He is obese. HENT:      Head: Normocephalic. Mouth/Throat:     Cardiovascular:      Rate and Rhythm: Normal rate and regular rhythm. Heart sounds: Normal heart sounds. Pulmonary:      Effort: Pulmonary effort is normal.      Breath sounds: Normal breath sounds. Skin:     General: Skin is warm and dry. Neurological:      Mental Status: He is alert and oriented to person, place, and time. Psychiatric:         Behavior: Behavior normal.         Thought Content: Thought content normal.         Judgment: Judgment normal.       /83   Pulse 70   Temp 97 °F (36.1 °C) (Temporal)   Resp 16   Ht 5' 8\" (1.727 m)   Wt (!) 355 lb (161 kg)   SpO2 98%   BMI 53.98 kg/m²     Assessment:       Diagnosis Orders   1. Mild episode of recurrent major depressive disorder ([facility]  [address], [place], LCSW, Counseling, [place]   2. Tonsil stone     3. Anxiety           Plan:   More than 50% of the time was spent counseling and coordinating care for a total time of 30 min face to face.   Brother is with him today and says that he feels his brother is not suicidal and feels he is safe but does notice he has drastic up and downs. I discussed counseling with him he has never done it before and is willing to. We discussed bipolar and the drastic mood swings. He may end up having to see a psychiatrist to get a diagnosis. The plan below. Patient given educational materials -see patient instructions. Discussed use, benefit, and side effects of prescribed medications. All patient questions answered. Pt voiced understanding. Reviewed health maintenance. Instructed to continue currentmedications, diet and exercise. Patient agreed with treatment plan. Follow up as directed. MEDICATIONS:  Orders Placed This Encounter   Medications    ARIPiprazole (ABILIFY) 5 MG tablet     Sig: Take 1 tablet by mouth daily Add to lexapro     Dispense:  30 tablet     Refill:  3    busPIRone (BUSPAR) 10 MG tablet     Sig: Take 1 tablet by mouth 2 times daily For anxiety     Dispense:  60 tablet     Refill:  0         ORDERS:  Orders Placed This Encounter   Procedures   [address], [place], Counseling, [place]       Follow-up:  Return in about 2 weeks (around 10/22/2020) for f/u with Antonio. PATIENT INSTRUCTIONS:  Patient Instructions   Counseling  Continue diet and exercise  Continue the lexapro and add abilify to help with depression and mood. These are daily. Start this first before adding buspar. buspar can be taken daily or only as needed for anxiety  Gargle after eat    Electronically signed by ABHISHEK Rick CNP on 10/8/2020 at 2:41 PM    EMR Dragon/transcription disclaimer:  Much of thisencounter note is electronic transcription/translation of spoken language to printed texts. The electronic translation of spoken language may be erroneous, or at times, nonsensical words or phrases may be inadvertentlytranscribed.   Although I have reviewed the note for such errors, some may still exist.

## 2020-11-05 ENCOUNTER — OFFICE VISIT (OUTPATIENT)
Dept: PRIMARY CARE CLINIC | Age: 25
End: 2020-11-05
Payer: COMMERCIAL

## 2020-11-05 VITALS
RESPIRATION RATE: 16 BRPM | OXYGEN SATURATION: 98 % | SYSTOLIC BLOOD PRESSURE: 122 MMHG | TEMPERATURE: 98.6 F | BODY MASS INDEX: 47.74 KG/M2 | HEIGHT: 68 IN | DIASTOLIC BLOOD PRESSURE: 78 MMHG | WEIGHT: 315 LBS | HEART RATE: 83 BPM

## 2020-11-05 PROCEDURE — 99214 OFFICE O/P EST MOD 30 MIN: CPT | Performed by: FAMILY MEDICINE

## 2020-11-05 RX ORDER — BUSPIRONE HYDROCHLORIDE 15 MG/1
10 TABLET ORAL 3 TIMES DAILY
Qty: 90 TABLET | Refills: 3 | Status: SHIPPED | OUTPATIENT
Start: 2020-11-05 | End: 2020-11-05 | Stop reason: SDUPTHER

## 2020-11-05 RX ORDER — BUSPIRONE HYDROCHLORIDE 15 MG/1
15 TABLET ORAL 3 TIMES DAILY
Qty: 90 TABLET | Refills: 0 | Status: SHIPPED | OUTPATIENT
Start: 2020-11-05 | End: 2020-12-05

## 2020-11-05 RX ORDER — ARIPIPRAZOLE 10 MG/1
10 TABLET ORAL DAILY
Qty: 30 TABLET | Refills: 5 | Status: SHIPPED | OUTPATIENT
Start: 2020-11-05 | End: 2020-11-19 | Stop reason: SDUPTHER

## 2020-11-05 ASSESSMENT — ENCOUNTER SYMPTOMS
CONSTIPATION: 0
COUGH: 0
COLOR CHANGE: 0
VOMITING: 0
DIARRHEA: 0
RHINORRHEA: 0
ABDOMINAL PAIN: 0
NAUSEA: 0

## 2020-11-05 NOTE — PROGRESS NOTES
SUBJECTIVE:    Patient ID: Jhoan Lugo is a 22 y.o. male. HPI:   Patient presents today for a 2-week follow-up after he saw Britany Naqvi back earlier last month. He states that he was having problems with mood fluctuations and states that he was having day-to-day fluctuations in his moods. He states that he was going from anxious to depressed. He states that he has had this problem in the past.  He states that he had been off of his Lexapro prior to seeing Ostrandersony Naqvi last month. He had been back on it for about a month prior to seeing her. He states that he really did not feel like it was helping much. She started him on Abilify 5 mg which he states he felt like helped the first week but he states that now he is having the mood cycling about once a week. He states that it did slow the mood cycling down some. He states that he will have a week where he feels depressed and down and then has a week that he feels good. He states that he is also having some paranoia which is new. He has never seen psychiatry. He is scheduled to see behavioral health on Monday for counseling. He states that he really cannot tell that the BuSpar is doing much for him. He did try taking 1 of these whenever he was having a panic attack and states that he really cannot tell much of a difference. He denies any suicidal thoughts or ideation.     Past Medical History:   Diagnosis Date    Anxiety     Hypertension     Obesity       Current Outpatient Medications   Medication Sig Dispense Refill    ARIPiprazole (ABILIFY) 10 MG tablet Take 1 tablet by mouth daily Add to lexapro 30 tablet 5    busPIRone (BUSPAR) 15 MG tablet Take 15 mg by mouth 3 times daily For anxiety 90 tablet 0    lisinopril (PRINIVIL;ZESTRIL) 40 MG tablet Take 1 tablet by mouth daily 30 tablet 5    escitalopram (LEXAPRO) 10 MG tablet Take 1 tablet by mouth daily 30 tablet 3    propranolol (INDERAL LA) 60 MG extended release capsule Take 1 capsule by mouth Temp 98.6 °F (37 °C) (Temporal)   Resp 16   Ht 5' 8\" (1.727 m)   Wt (!) 362 lb (164.2 kg)   SpO2 98%   BMI 55.04 kg/m²      ASSESSMENT:    Soco Day was seen today for 2 week follow-up. Diagnoses and all orders for this visit:    Severe episode of recurrent major depressive disorder, without psychotic features ([address]    Paranoid ([address]    Mood disorder ([address]    Other orders  -     ARIPiprazole (ABILIFY) 10 MG tablet; Take 1 tablet by mouth daily Add to lexapro  -     Discontinue: busPIRone (BUSPAR) 15 MG tablet; Take 10 mg by mouth 3 times daily For anxiety  -     busPIRone (BUSPAR) 15 MG tablet; Take 15 mg by mouth 3 times daily For anxiety        PLAN:    Increase Abilify to 10 mg. We are going to increase BuSpar to 15 mg. Follow-up with us if symptoms or not improving otherwise I would like to see him back in 2 weeks. If symptoms not getting better at that time discussed we may put a referral in for psychiatry. EMR Dragon/transcription disclaimer:  Much of this encounter note is electronic transcription/translation of spoken language toprinted texts. The electronic translation of spoken language may be erroneous, or at times, nonsensical words or phrases may be inadvertently transcribed.   Although I have reviewed the note for such errors, some may stillexist.

## 2020-11-09 ENCOUNTER — VIRTUAL VISIT (OUTPATIENT)
Dept: PSYCHOLOGY | Age: 25
End: 2020-11-09
Payer: COMMERCIAL

## 2020-11-09 PROCEDURE — 90791 PSYCH DIAGNOSTIC EVALUATION: CPT | Performed by: SOCIAL WORKER

## 2020-11-09 ASSESSMENT — PATIENT HEALTH QUESTIONNAIRE - PHQ9
SUM OF ALL RESPONSES TO PHQ9 QUESTIONS 1 & 2: 4
5. POOR APPETITE OR OVEREATING: 3
2. FEELING DOWN, DEPRESSED OR HOPELESS: 2
8. MOVING OR SPEAKING SO SLOWLY THAT OTHER PEOPLE COULD HAVE NOTICED. OR THE OPPOSITE, BEING SO FIGETY OR RESTLESS THAT YOU HAVE BEEN MOVING AROUND A LOT MORE THAN USUAL: 1
SUM OF ALL RESPONSES TO PHQ QUESTIONS 1-9: 17
4. FEELING TIRED OR HAVING LITTLE ENERGY: 1
SUM OF ALL RESPONSES TO PHQ QUESTIONS 1-9: 17
9. THOUGHTS THAT YOU WOULD BE BETTER OFF DEAD, OR OF HURTING YOURSELF: 0
SUM OF ALL RESPONSES TO PHQ QUESTIONS 1-9: 17
3. TROUBLE FALLING OR STAYING ASLEEP: 3
6. FEELING BAD ABOUT YOURSELF - OR THAT YOU ARE A FAILURE OR HAVE LET YOURSELF OR YOUR FAMILY DOWN: 2
7. TROUBLE CONCENTRATING ON THINGS, SUCH AS READING THE NEWSPAPER OR WATCHING TELEVISION: 3
1. LITTLE INTEREST OR PLEASURE IN DOING THINGS: 2
10. IF YOU CHECKED OFF ANY PROBLEMS, HOW DIFFICULT HAVE THESE PROBLEMS MADE IT FOR YOU TO DO YOUR WORK, TAKE CARE OF THINGS AT HOME, OR GET ALONG WITH OTHER PEOPLE: 2

## 2020-11-09 ASSESSMENT — ANXIETY QUESTIONNAIRES
1. FEELING NERVOUS, ANXIOUS, OR ON EDGE: 2-OVER HALF THE DAYS
2. NOT BEING ABLE TO STOP OR CONTROL WORRYING: 3-NEARLY EVERY DAY
GAD7 TOTAL SCORE: 18
6. BECOMING EASILY ANNOYED OR IRRITABLE: 2-OVER HALF THE DAYS
4. TROUBLE RELAXING: 3-NEARLY EVERY DAY
3. WORRYING TOO MUCH ABOUT DIFFERENT THINGS: 3-NEARLY EVERY DAY
5. BEING SO RESTLESS THAT IT IS HARD TO SIT STILL: 3-NEARLY EVERY DAY
7. FEELING AFRAID AS IF SOMETHING AWFUL MIGHT HAPPEN: 2-OVER HALF THE DAYS

## 2020-11-09 NOTE — PATIENT INSTRUCTIONS
Recommendations to patient:      1. Practice new coping, stress management, relaxation skills at least                   two times a day for at least 10-30 minutes. 2. Find at least one positive physical outlet per day that makes you feel better. 3. Talk things over with a good friend. Practice letting things go. 4. Stop, breathe, reset. \"I am ok. \"   5. Reconnect with friends. 6. I can clean my room, do my chores. 7. I would like to have a job and make decent money. How can I get there? I could try a trade                    school and work up. Maybe at a bank. 8. I can look at myself in a good way. Finish the day with three good things. 9. I can put things up at night. Scheduled follow up appointment. Call for a sooner appointment if needed or if you need to change or cancel you appointment. Sheyla [phone]                                              Depression Cycle         Thoughts        -There is no point in doing anything        -I dont have the energy        -I dont feel like it        -I will probably fail        -I need to rest more        -Ill do it later                          Depressive Symptoms     Behaviors  -Tired/Overwhelmed      -Stay in Bed  -Bored        -Avoid People  -Depressed       -Avoid Fun Activities  -Feeling Worthless      -Avoid Work  -Apathetic/Discouraged     -Guilty                                          Consequences of Behaviors      -Isolated from friends and family      -Decreased number of rewarding experiences      -Decreased sense of accomplishment and pleasure      -Discouraged      -Sink deeper and deeper into a state of paralysis      -Decreased productivity convinces you that you are inadequate      The Stress Response and How It Can Affect You   The stress response, or fight or flight response is the emergency reaction system of the body. It is there to keep you safe in emergencies.  The stress response includes physical and Poor hygiene   ? Eating  Seeking reassurance   Nail biting   Skin picking   ? Talking        ? Body checking   Sexual problems  Foot tapping  Fidgeting Rapid walking    ? Exercise   Teeth clenching           Multitasking  Aggressive speaking       ? Fun activities  ? Sleeping      ? Relaxing activities     Seeking information     The parasympathetic nervous system in your body is designed to turn on your bodys relaxation response. Your behaviors and thinking can keep your bodys natural relaxation response from operating at its best.   Getting your body to relax on a daily basis for at least brief periods can help decrease unpleasant stress responses. Learning to relax your body, through specific breathing and relaxation exercises as well as by minimizing stressful thinking, can help your bodys natural relaxation system be more effective. STRESS MANAGEMENT STRATEGIES    1. Recognize Stress:  Learning to recognize when your body is reacting to stress and identifying our stressors are the first steps in managing stress. 2. Take a Break:  A change of pace, no matter how short, gives us a new outlook on old problems. Take a vacation 20 minutes a day - enjoy a change from the daily routine. 3. Learn to Relax:  Under stress, the muscles in our bodies stay tight. One of the most effective ways to combat tensions is deep muscle relaxation. Other techniques that produce muscle and mental relaxation are yoga, prayer, and deep breathing. 4. Be Nutritionally Aware:  Good nutrition is vital to optimum health, and is especially critical when we are under unusual stress, or going through a major life change. 5. Exercise Regularly:  Just like nutrition, exercise is imperative for maintaining good fitness. Whatever you enjoy - swimming, walking, jogging, aerobic exercise - will help you let off steam and work out stress.     6. Plan your Work:  Tension and anxiety really build up when our work seems endless. Plan your work to use time and energy more efficiently. Take one thing at a time. 7. Talk it Over: This may be the most important thing you can do for yourself if you cant get a handle on things. Find a good listener. Just as a pressure relief valve allows steam to flow out of a pressure cooker and keeps it from blowing up, so talking allows stress to flow out of the body and keeps us from blowing up. 8. Accept What You Cannot Change:  If the problem is beyond your control at this time, try your best to accept it until you can change it. It beats spinning your wheels and getting nowhere. 9. Evaluate Your Perceptions:  What we think is sometimes what we feel. If we constantly think unrealistic or alarming thoughts about ourselves or other folks, then our stress level is increased. 10. Relax Unrealistic Standards:  When we set unrealistic standards for ourselves, we usually can never reach them. If we do, we burn out quickly. Set reasonable goals and standards. 11. Reward Yourself:  Find ways to reward yourself when youve completed a minor or major task. We cannot always depend on others to recognize us, so we must develop our own reward system. 12. Become Assertive: Take steps to solve problems instead of feeling helpless. Distinguishing assertiveness (respecting others rights and your rights) from aggressiveness and passivity can do much to resolve internal stress. 13. Rediscover Humor:  Learn to laugh at yourself and your situation! 14. Increase Pleasurable Activities:  Take time to participate in fun, pleasurable, activities on a regular basis. Personal Thought  Control. Our thinking often creates anxiety for us. Getting better control of our thinking can go a long way in helping us cope. The following steps can be useful. 1. Let yourself become aware of thoughts you have when you are anxious.   What are the words that you are saying to yourself at that moment? Sometimes it takes a little practice before we become aware of our thoughts. Some examples might be:  I know something bad is going to happen, or This is horrible or Eleuterio Hoof is this happening to me!?  2. Write your thoughts down. Its much easier to work with our thoughts, analyze them, and replace them if they are in black and white.   3. Ask yourself the following questions about your thoughts:  a. Is it true? (Is it logically correct? Where is the evidence to support the truth of that thought? Are there alternative ways of thinking that would be more correct?). If a thought is not as true as it could be, replace it with a more realistic and helpful one. The majority of thoughts we have that generate anxiety are not the most realistic appraisals of the situation. b. So what? (If this is logically correct, what does it mean to me? Is there anything I can do about the situation? Is it in my best interest to get anxious about this?). 4. Use coping self-statements. When feeling anxious, you may be able to tell yourself automatic phrases without thinking too much about it. A couple of examples would be phrases such as Its OK, I can handle it, or Ive been through things like this before and have done all right.   Notice that these statements tend to be true for all of us. 5. Notice a change in your emotional state as you change your thinking. As your thoughts become more realistic, you will probably notice a decrease in anxiety and tension, and an increase in your ability to cope. Increasing Physical Activity   Do you need to change? Individuals who engage in at least 30 minutes of moderate physical activity at least 5 days a week are healthier overall compared with those who do less physical activity.  If keeping extra weight off is important to you, then 60 to 90 minutes of moderate activity might be an important goal. Examples of moderate physical activities include brisk walking, riding a bicycle, and raking leaves. You might think it would be difficult to find 30 minutes, much less 90 minutes, to engage in physical activity or exercise. How do you change? Check with your physician. Make sure you physician has given you the OK. Have fun. Choose an activity that you enjoy. Set goals--short term and long term. Specify days, times activities, and duration. Start slowly and gradually increase. Generally you dont want to increase by more than 10% each week. Track your progress. This will help you to know whether you are staying on your plan. Have a plan B. If you are planning to do you physical activity outside, what are you going to do if the weather is bad outside? What about on vacation? How about during the holidays? Think ahead about the week and consider what you can do to meet your goals if something (e.g., bad weather) gets in the way. Reward yourself. When you meet your goals, reward yourself. Modifying Eating Habits   1. Do nothing else while eating. 2. Eat in the same place each time. 3. Do not clean your plate. 4. Eat on a schedule. 5. Slow your eating rate. Put your fork down between bites. Pause during the meal.   6. When shopping for food, shop on a full stomach, shop from a list, and get foods that require preparation. 7. When storing foods, store high-calorie foods out of sight (Out of sight, out of mouth) and keep healthy snacks available. 8. When serving and dispensing food,    Remove serving dishes from the table    Leave the table after eating    Serve and eat one portion at a time    Wait 5 minutes before getting second servings    Avoid dispensing (serving) food     9.  When eating away from home,    Order a la carte meals    Watch the salad dressing    Beware of the breadbasket    Be wise with dessert    Share your meal with your friend/spouse/partner    Take a portion of the meal home to eat at another time                                                 Relaxation:  Diaphragmatic Breathing             ______________________________________________________________________________    1. Sit in a comfortable position  2. Place one hand on your stomach and the other on your chest  3. Try to breathe so that only your stomach rises and falls    As you inhale, concentrate on your chest remaining relatively still while your stomach rises. It may be helpful for you to imagine that your pants are too big and you need to push your stomach out to hold them up. When exhaling, allow your stomach to fall in and the air to fully escape. Inhale slowly. You may choose to hold the air in for about a second. Exhale slowly. Dont push the air out, but just let the natural pressure of your body slowly move it out. It is normal for this healthy method of breathing to feel a little awkward at first.  With practice, it will feel more natural.    4.  Take some deep breaths, concentrating on only moving your stomach. Hold each            breath for 2 to 3 seconds before exhaling. 5.   Get your mind on your side    One other important factor in getting relaxed is your mind. Your mind and body are connected. The mind influences the body and the body influences the mind. What you do with your mind when you are trying to relax is very important. The key is to avoid thinking about stressful things. You can think about      Neutral things (e.g., counting, saying a word like calm or relax)   Pleasant things (e.g., imagining a pleasant place)    6. Return to regular breathing, continuing to breathe so that only your stomach moves. 7.  It is recommended that you practice 2 times per day, 10 minutes each time.

## 2020-11-09 NOTE — PROGRESS NOTES
approach. Advised patient/parent to guard AVS and file at home to protect private information. Advised patient/parent to only hand in excuse if needed and not AVS.  Pt indicated understanding. Feedback given to PCP. S:  Patient reports problems with feeling anxious, depressed, have been having mood swings. I have a week when I am fine, then I have a week when I am down. No highs. I have always worried about things, over thinking. My room is a mess. I get irritable. I feel paranoid. Describes it as I feel that other people are against me, judged. This has worsening over the last couple of years. Internalizes, never has taken things out on others. Medications have been changed several times. Discussed referral to psychiatry, which may indicated. Live with brother and cousin, getting along well. Mother lives across the stress. My biggest problem is at work, part time, too much down time, that's when my head gets into negative, about the future. It is happy thoughts, but it is out of reach. I know it takes work to get there. I get in my own head. This semester I had to drop out again. Trying to get my associates. Over thinking, trouble concentrating. Planning to enroll again. I walk a lot, spend time with my brother, cousin and sister. Laying in bed a lot lately and that is not good. Not have friends, really. Don't get to do a lot of friends, they started jobs.      O:  MSE:    Mood    Anxious  Depressed  Low self-esteem  Irritability  [place]  Demoralization  Affect    anxiety  Appetite I over eat, comfort eating, gained weight back  Sleep disturbance Yes, not getting enough sleep  Fatigue Yes  Loss of pleasure Yes  Attention/Concentration    intact  Morbid ideation No  Suicide Assessment    no suicidal ideation      History:    Medications:   Current Outpatient Medications   Medication Sig Dispense Refill    ARIPiprazole (ABILIFY) 10 MG tablet Take 1 tablet by mouth daily Add to lexapro 30 tablet 5    busPIRone (BUSPAR) 15 MG tablet Take 15 mg by mouth 3 times daily For anxiety 90 tablet 0    lisinopril (PRINIVIL;ZESTRIL) 40 MG tablet Take 1 tablet by mouth daily 30 tablet 5    escitalopram (LEXAPRO) 10 MG tablet Take 1 tablet by mouth daily 30 tablet 3    propranolol (INDERAL LA) 60 MG extended release capsule Take 1 capsule by mouth daily 30 capsule 5     No current facility-administered medications for this visit. Social History:   Social History     Socioeconomic History    Marital status: Single     Spouse name: Not on file    Number of children: Not on file    Years of education: Not on file    Highest education level: Not on file   Occupational History    Not on file   Social Needs    Financial resource strain: Not on file    Food insecurity     Worry: Not on file     Inability: Not on file    Transportation needs     Medical: Not on file     Non-medical: Not on file   Tobacco Use    Smoking status: Never Smoker    Smokeless tobacco: Never Used   Substance and Sexual Activity    Alcohol use: No    Drug use: No    Sexual activity: Not on file   Lifestyle    Physical activity     Days per week: Not on file     Minutes per session: Not on file    Stress: Not on file   Relationships    Social connections     Talks on phone: Not on file     Gets together: Not on file     Attends [employer] service: Not on file     Active member of club or organization: Not on file     Attends meetings of clubs or organizations: Not on file     Relationship status: Not on file    Intimate partner violence     Fear of current or ex partner: Not on file     Emotionally abused: Not on file     Physically abused: Not on file     Forced sexual activity: Not on file   Other Topics Concern    Not on file   Social History Narrative    Not on file       TOBACCO:   reports that he has never smoked. He has never used smokeless tobacco.  ETOH:   reports no history of alcohol use.     Family History: Family History   Problem Relation Age of Onset    Diabetes Maternal Grandfather     Heart Attack Maternal Grandfather     Heart Attack Paternal Grandfather     Depression Mother     Mental Illness Father          A:  Patient presents for consult due to problems with depression, general and social anxiety, low self esteem. Not sleeping enough. Continued consultation is clinically/medically necessary to support in learning new skills and build confidence to deal better with these issues. Patient response to consults, finds new strategies helpful. PHQ Scores 11/9/2020 10/8/2020   PHQ2 Score 4 5   PHQ9 Score 17 20     Interpretation of Total Score Depression Severity: 1-4 = Minimal depression, 5-9 = Mild depression, 10-14 = Moderate depression, 15-19 = Moderately severe depression, 20-27 = Severe depression    LACIE-7  Feeling nervous, anxious, or on edge: 2-Over half the days  Not able to stop or control worrying: 3-Nearly every day  Worrying too much about different things: 3-Nearly every day  Trouble relaxing: 3-Nearly every day  Being so restless that it's hard to sit still: 3-Nearly every day  Becoming easily annoyed or irritable: 2-Over half the days  Feeling afraid as if something awful might happen: 2-Over half the days  LACIE-7 Total Score: 18    LACIE-7 score interpretation:  0-4 Subclinical, 5-9 Mild, 10-14 Moderate, 15-21 Severe        Diagnosis:    1. MDD (major depressive disorder), recurrent episode, moderate ([address]    2. LACIE (generalized anxiety disorder)    3. Situational stress    4.  Social anxiety disorder          Diagnosis Date    Anxiety     Hypertension     Obesity          Plan:  Pt interventions:  Discussed and set plan for behavioral activation, Discussed self-care (sleep, nutrition, rewarding activities, social support, exercise), Discussed use of imagery, distractions, relaxation, mood management, communication training, questioning unhelpful thinking, problem-solving, and behavioral activation to manage pain and [place]-setting to identify pt's primary goals for [facility] visit / overall health      Pt Behavioral Change Plan:    See patient instructions.

## 2020-11-19 ENCOUNTER — OFFICE VISIT (OUTPATIENT)
Dept: PRIMARY CARE CLINIC | Age: 25
End: 2020-11-19
Payer: COMMERCIAL

## 2020-11-19 VITALS
DIASTOLIC BLOOD PRESSURE: 92 MMHG | TEMPERATURE: 98.3 F | HEART RATE: 87 BPM | OXYGEN SATURATION: 98 % | RESPIRATION RATE: 20 BRPM | HEIGHT: 68 IN | SYSTOLIC BLOOD PRESSURE: 136 MMHG | WEIGHT: 315 LBS | BODY MASS INDEX: 47.74 KG/M2

## 2020-11-19 PROCEDURE — 99214 OFFICE O/P EST MOD 30 MIN: CPT | Performed by: FAMILY MEDICINE

## 2020-11-19 RX ORDER — ARIPIPRAZOLE 20 MG/1
20 TABLET ORAL DAILY
Qty: 30 TABLET | Refills: 5 | Status: SHIPPED | OUTPATIENT
Start: 2020-11-19

## 2020-11-19 RX ORDER — ESCITALOPRAM OXALATE 20 MG/1
20 TABLET ORAL DAILY
Qty: 30 TABLET | Refills: 5 | Status: SHIPPED | OUTPATIENT
Start: 2020-11-19

## 2020-11-19 ASSESSMENT — ENCOUNTER SYMPTOMS
CONSTIPATION: 0
RHINORRHEA: 0
DIARRHEA: 0
COUGH: 0
COLOR CHANGE: 0
ABDOMINAL PAIN: 0
NAUSEA: 0
VOMITING: 0

## 2020-11-19 NOTE — PROGRESS NOTES
SUBJECTIVE:    Patient ID: Hector Kim is a 22 y.o. male. HPI:   Patient presents today for 2-week follow-up on his mood and irritability. He states that he really does not feel like the medications are helping much. He states that he still feels down most days. He states that he is sleeping a lot. He states that he is sleeping a lot to try to avoid the anxiety. He states that he is tolerating his medications well denies any side effects to them. He states that he has not seen psychiatry but would be okay if we put a referral in for them. States that he is having some paranoid thoughts. He states that he has thoughts that his family is all against him. He states that this has been going on for a long time but seems to be getting worse. He states that the Abilify really has not helped with this much. He has not had any evaluation done by psychiatry. Past Medical History:   Diagnosis Date    Anxiety     Hypertension     Obesity       Current Outpatient Medications   Medication Sig Dispense Refill    escitalopram (LEXAPRO) 20 MG tablet Take 1 tablet by mouth daily 30 tablet 5    ARIPiprazole (ABILIFY) 20 MG tablet Take 1 tablet by mouth daily Add to lexapro 30 tablet 5    busPIRone (BUSPAR) 15 MG tablet Take 15 mg by mouth 3 times daily For anxiety 90 tablet 0    lisinopril (PRINIVIL;ZESTRIL) 40 MG tablet Take 1 tablet by mouth daily 30 tablet 5    propranolol (INDERAL LA) 60 MG extended release capsule Take 1 capsule by mouth daily 30 capsule 5     No current facility-administered medications for this visit. No Known Allergies    Review of Systems   Constitutional: Positive for fatigue. Negative for activity change and appetite change. HENT: Negative for congestion and rhinorrhea. Eyes: Negative for visual disturbance. Respiratory: Negative for cough. Cardiovascular: Negative for chest pain and palpitations.    Gastrointestinal: Negative for abdominal pain, constipation, psychotic features ([facility] [address]  -     External Referral To Psychiatry    Paranoid [facility])  -     External Referral To Psychiatry    Mood disorder [facility])  -     External Referral To Psychiatry    Other orders  -     escitalopram (LEXAPRO) 20 MG tablet; Take 1 tablet by mouth daily  -     ARIPiprazole (ABILIFY) 20 MG tablet; Take 1 tablet by mouth daily Add to lexapro        PLAN:  We are going to increase Lexapro to 20 mg.  I am also going to increase Abilify to 20 mg. Follow-up with us in 4 weeks for recheck unless needed sooner. I have gone ahead and put a referral in for psychiatry for further work-up and evaluation due to the paranoia that he is having. EMR Dragon/transcription disclaimer:  Much of this encounter note is electronic transcription/translation of spoken language toprinted texts. The electronic translation of spoken language may be erroneous, or at times, nonsensical words or phrases may be inadvertently transcribed.   Although I have reviewed the note for such errors, some may stillexist.

## 2023-07-06 ENCOUNTER — OFFICE VISIT (OUTPATIENT)
Age: 28
End: 2023-07-06
Payer: MEDICAID

## 2023-07-06 VITALS
BODY MASS INDEX: 49.44 KG/M2 | HEART RATE: 85 BPM | HEIGHT: 67 IN | SYSTOLIC BLOOD PRESSURE: 128 MMHG | DIASTOLIC BLOOD PRESSURE: 70 MMHG | RESPIRATION RATE: 19 BRPM | TEMPERATURE: 98.4 F | OXYGEN SATURATION: 97 % | WEIGHT: 315 LBS

## 2023-07-06 DIAGNOSIS — J01.90 ACUTE NON-RECURRENT SINUSITIS, UNSPECIFIED LOCATION: Primary | ICD-10-CM

## 2023-07-06 DIAGNOSIS — J02.9 SORE THROAT: ICD-10-CM

## 2023-07-06 LAB — S PYO AG THROAT QL: NORMAL

## 2023-07-06 PROCEDURE — 99213 OFFICE O/P EST LOW 20 MIN: CPT | Performed by: NURSE PRACTITIONER

## 2023-07-06 RX ORDER — AMOXICILLIN AND CLAVULANATE POTASSIUM 875; 125 MG/1; MG/1
1 TABLET, FILM COATED ORAL 2 TIMES DAILY
Qty: 20 TABLET | Refills: 0 | Status: SHIPPED | OUTPATIENT
Start: 2023-07-06 | End: 2023-07-16

## 2023-07-06 RX ORDER — CARVEDILOL 6.25 MG/1
6.25 TABLET ORAL DAILY
COMMUNITY
Start: 2023-06-16

## 2023-07-06 RX ORDER — SEMAGLUTIDE 0.68 MG/ML
INJECTION, SOLUTION SUBCUTANEOUS
COMMUNITY
Start: 2023-06-02

## 2023-07-06 ASSESSMENT — ENCOUNTER SYMPTOMS
STRIDOR: 0
TROUBLE SWALLOWING: 0
CHEST TIGHTNESS: 0
SINUS PRESSURE: 0
ABDOMINAL PAIN: 0
COUGH: 1
SHORTNESS OF BREATH: 0
EYE DISCHARGE: 0
EYE PAIN: 0
ABDOMINAL DISTENTION: 0
SORE THROAT: 1
WHEEZING: 0
COLOR CHANGE: 0

## 2023-07-06 NOTE — PATIENT INSTRUCTIONS
Encourage fluids, Tylenol/Ibuprofen, OTC decongestants   Strep negative  Antibiotic sent to pharmacy.   If symptoms worsen or fail to improve follow-up with PCP  If SOB, chest pain, or high persistent fevers occur, go to ER    Patient verbalized understanding and agrees to plan

## 2023-07-06 NOTE — PROGRESS NOTES
for 10 days 20 tablet 0    escitalopram (LEXAPRO) 20 MG tablet Take 1 tablet by mouth daily 30 tablet 5    lisinopril (PRINIVIL;ZESTRIL) 40 MG tablet Take 1 tablet by mouth daily 30 tablet 5    ARIPiprazole (ABILIFY) 20 MG tablet Take 1 tablet by mouth daily Add to lexapro (Patient not taking: Reported on 7/6/2023) 30 tablet 5    propranolol (INDERAL LA) 60 MG extended release capsule Take 1 capsule by mouth daily (Patient not taking: Reported on 7/6/2023) 30 capsule 5     No current facility-administered medications for this visit. No Known Allergies    Health Maintenance   Topic Date Due    COVID-19 Vaccine (1) Never done    Varicella vaccine (1 of 2 - 2-dose childhood series) Never done    Depression Screen  Never done    HIV screen  Never done    Hepatitis C screen  Never done    DTaP/Tdap/Td vaccine (1 - Tdap) Never done    Flu vaccine (1) 08/01/2023    Hepatitis A vaccine  Aged Out    Hib vaccine  Aged Out    Meningococcal (ACWY) vaccine  Aged Out    Pneumococcal 0-64 years Vaccine  Aged Out       Subjective:   Review of Systems   Constitutional:  Negative for chills, fatigue and fever. HENT:  Positive for congestion, ear pain and sore throat. Negative for sinus pressure and trouble swallowing. Eyes:  Negative for pain and discharge. Respiratory:  Positive for cough. Negative for chest tightness, shortness of breath, wheezing and stridor. Cardiovascular:  Negative for chest pain and palpitations. Gastrointestinal:  Negative for abdominal distention and abdominal pain. Genitourinary:  Negative for difficulty urinating, dysuria and hematuria. Musculoskeletal:  Negative for arthralgias, neck pain and neck stiffness. Skin:  Negative for color change and rash. Neurological:  Negative for dizziness, syncope, speech difficulty, weakness and numbness. Psychiatric/Behavioral:  Negative for confusion and suicidal ideas. Objective    Physical Exam  Vitals and nursing note reviewed.

## 2024-07-10 ENCOUNTER — TELEPHONE (OUTPATIENT)
Dept: SURGERY | Facility: CLINIC | Age: 29
End: 2024-07-10
Payer: MEDICAID

## 2024-07-10 NOTE — TELEPHONE ENCOUNTER
Called PT to remind them of their upcoming appt tomorrow. Informed them of their appt D/T. Reminder to bring new patient paperwork, insurance cards, and photo ID. PT voiced understanding and confirmed appt.    JUDI  07/10

## 2024-07-11 ENCOUNTER — OFFICE VISIT (OUTPATIENT)
Dept: BARIATRICS/WEIGHT MGMT | Facility: CLINIC | Age: 29
End: 2024-07-11
Payer: MEDICAID

## 2024-07-11 ENCOUNTER — NUTRITION (OUTPATIENT)
Dept: BARIATRICS/WEIGHT MGMT | Facility: CLINIC | Age: 29
End: 2024-07-11
Payer: MEDICAID

## 2024-07-11 VITALS
OXYGEN SATURATION: 94 % | SYSTOLIC BLOOD PRESSURE: 138 MMHG | HEART RATE: 90 BPM | BODY MASS INDEX: 46.65 KG/M2 | DIASTOLIC BLOOD PRESSURE: 94 MMHG | TEMPERATURE: 94 F | HEIGHT: 69 IN | WEIGHT: 315 LBS

## 2024-07-11 DIAGNOSIS — E66.01 CLASS 3 SEVERE OBESITY DUE TO EXCESS CALORIES WITH SERIOUS COMORBIDITY AND BODY MASS INDEX (BMI) OF 60.0 TO 69.9 IN ADULT: Primary | ICD-10-CM

## 2024-07-11 DIAGNOSIS — I10 ESSENTIAL HYPERTENSION: ICD-10-CM

## 2024-07-11 DIAGNOSIS — R73.03 PREDIABETES: ICD-10-CM

## 2024-07-11 PROBLEM — E66.813 CLASS 3 SEVERE OBESITY DUE TO EXCESS CALORIES WITH SERIOUS COMORBIDITY AND BODY MASS INDEX (BMI) OF 60.0 TO 69.9 IN ADULT: Status: ACTIVE | Noted: 2024-07-11

## 2024-07-11 RX ORDER — CARVEDILOL 12.5 MG/1
12.5 TABLET ORAL 2 TIMES DAILY
COMMUNITY
Start: 2024-06-27

## 2024-07-11 RX ORDER — LISINOPRIL 20 MG/1
20 TABLET ORAL DAILY
COMMUNITY
Start: 2024-06-27

## 2024-07-11 RX ORDER — SEMAGLUTIDE 0.68 MG/ML
INJECTION, SOLUTION SUBCUTANEOUS
COMMUNITY
Start: 2024-06-11

## 2024-07-11 NOTE — PROGRESS NOTES
Patient Care Team:  Mariangel Kennedy MD as PCP - General (Family Medicine)      Subjective   History of Present Illness  Patient is a 28 y.o. male presents with morbid obesity and his Body mass index is 65.66 kg/m². The patient presents for nutrition counseling and bariatric surgery. He is accompanied by his father.    The patient has been struggling with obesity since childhood. Despite attempts to manage his obesity through calorie counting, he has not yet tried any specific diets such as Atkins or Keto. His medical history includes hypertension, for which he takes Coreg and lisinopril. Additionally, he is prediabetic, for which he takes Ozempic 0.5 mg. His weight began to worsen in 2016, with his highest recorded weight recorded at 438 pounds. His surgical history includes a tongue surgery during his teenage years, with no history of hernia repair, esophageal procedures, or abdominal surgeries.    Patient was referred to our practice by PCP.    Review of Systems   Constitutional: Negative.    Respiratory: Negative.     Cardiovascular: Negative.    Gastrointestinal: Negative.    Endocrine: Negative.    Musculoskeletal: Negative.    Psychiatric/Behavioral: Negative.          History  Past Medical History:   Diagnosis Date    Anxiety     Depression     Essential hypertension     essential     Fatigue     Morbid obesity     Snoring     Somnolence, daytime       Past Surgical History:   Procedure Laterality Date    TONGUE SURGERY  02/2009      Social History     Socioeconomic History    Marital status: Single   Tobacco Use    Smoking status: Never    Smokeless tobacco: Never   Vaping Use    Vaping status: Never Used   Substance and Sexual Activity    Alcohol use: No    Drug use: No      Family History   Problem Relation Age of Onset    Hypertension Mother     Obesity Mother     Hypertension Father     Obesity Father     Stroke Maternal Grandmother     Cancer Maternal Grandfather     Heart disease  Maternal Grandfather     Diabetes Maternal Grandfather     Hypertension Maternal Grandfather     Stroke Maternal Grandfather     Obesity Maternal Grandfather     Arthritis Paternal Grandmother     Hypertension Paternal Grandmother     Cancer Paternal Grandfather     Heart disease Paternal Grandfather     Hypertension Paternal Grandfather     Obesity Paternal Grandfather       No Known Allergies       Current Outpatient Medications:     carvedilol (COREG) 12.5 MG tablet, Take 1 tablet by mouth 2 (Two) Times a Day., Disp: , Rfl:     lisinopril (PRINIVIL,ZESTRIL) 20 MG tablet, Take 1 tablet by mouth Daily. for blood pressure, Disp: , Rfl:     Ozempic, 0.25 or 0.5 MG/DOSE, 2 MG/3ML solution pen-injector, INJECT 0.25 MG SUBCUTANEOUSLY ONCE A WEEK ON THE SAME DAY EACH WEEK FOR 4 WEEKS THEN INCREASE TO 0.5 MG SUBCUTANEOUSLY ONCE WEEKLY ON THE SAME DAY EACH WEEK, Disp: , Rfl:     Objective     Vital Signs  Temp:  [94 °F (34.4 °C)] 94 °F (34.4 °C)  Heart Rate:  [90] 90  BP: (138)/(94) 138/94  Body mass index is 65.66 kg/m².      07/11/24  0957   Weight: (!) 199 kg (438 lb 3.2 oz)            Physical Exam  Vitals reviewed.   Constitutional:       Appearance: He is obese.   Cardiovascular:      Rate and Rhythm: Normal rate and regular rhythm.   Pulmonary:      Effort: Pulmonary effort is normal.   Abdominal:      General: Bowel sounds are normal.      Palpations: Abdomen is soft.   Musculoskeletal:         General: Normal range of motion.   Skin:     General: Skin is warm and dry.   Neurological:      Mental Status: He is alert and oriented to person, place, and time.   Psychiatric:         Mood and Affect: Mood normal.         Behavior: Behavior normal.        Physical Exam  Vital Signs  BMI is 65.      Results    I reviewed the patient's new clinical results.      Assessment & Plan   Diagnoses and all orders for this visit:    1. Class 3 severe obesity due to excess calories with serious comorbidity and body mass index (BMI)  of 60.0 to 69.9 in adult (Primary)    2. Essential hypertension  Overview:  essential     Assessment & Plan:  Hypertension is stable and controlled  Continue current treatment regimen.  Weight loss.  Blood pressure will be reassessed  with PCP, monitor levels at home .      3. Prediabetes  Comments:  Continue ozempic      Assessment & Plan  1. Class 3 obesity.  The patient's BMI is currently at 65, indicating a class 3 category of obesity. A comprehensive pre-surgery work-up will be conducted during his second appointment, followed by appointments. The patient is advised to maintain his current regimen of Coreg and lisinopril, while monitoring his blood pressure. The aim is to maintain his BMI below 30 for a minimum of three consecutive years. A sleeve gastrectomy is recommended.    I have also recommended that he obtain a cardiac risk assessment and psychiatric evaluation as well as any other preoperative requirements prior to surgery consideration.      Patient is a 28 y.o. male who has morbid obesity with Body mass index is 65.66 kg/m². and desires surgical weight loss. Patient has been advised that this surgery is considered to be elective major surgery that is typically done laparoscopically. Patient is a potentially good surgical candidate. Patient will need to meet with the Bariatric Surgeon to further discuss surgical options. Patient has been advised that they will need to have a work-up prior to surgery. This work-up will include but is not limited to an EKG, an EGD to assess for H. Pylori and Arango's esophagus, and a psychological evaluation, with additional testing as necessary. Pre-op testing will be ordered at next visit. Today the patient received the 4 meals/day diet prescription, which was explained to patient.  Patient will see the dietitian today to further discuss goals for diet, exercise, and lifestyle. Patient has received intensive behavioral therapy for obesity today. I explained the  pathophysiology of the disease and its storage component. We also discussed Dr. Corrigan' pearls of the program. Nutrition counseling ordered today.     Current comorbid condition associated with he morbid obesity is reported to be stable on he current treatment regimen and medications. We anticipate the comorbid condition to improve as we address he morbid obesity.       Pre-op testing:     Seminar - Completed  EGD - Needed, but not yet ordered  H. Pylori - Will be done with EGD   H. Pylori Stool -  N/A    Psychological Evaluation - Needed, but not yet ordered    EKG - Needed, but not yet ordered    Cardiology - If EKG abnormal, cardiology will be ordered     TSH - Needed, but not yet ordered  Nicotine - N/A    Pulmonary Clearance - N/A   Drug Tests - N/A    Dietitian - Needs    ADDI Cagle   11:00 CDT  07/11/24    A total of 30 minutes was spent face to face with this patient and over half of the time was spent on counseling and coordination of care for the disease of obesity. We specifically reviewed the dietary prescription and I made recommendations toward increasing exercise as tolerated as well as focusing on training their behavior toward storing less.    Patient or patient representative verbalized consent for the use of Ambient Listening during the visit with  ADDI Cagle for chart documentation. 7/11/2024  10:12 CDT

## 2024-07-11 NOTE — PROGRESS NOTES
"Metabolic and Bariatric Surgery Adult Nutrition Assessment    Patient Name: Perez Choi   YOB: 1995   MRN: [id number]     Assessment Date:  07/11/2024     Reason for Visit: Initial New Patient Nutrition Education Class    Treatment Pathway: Preoperative Bariatric Surgery    Assessment    Anthropometrics   Wt Readings from Last 1 Encounters:   07/11/24 (!) 199 kg (438 lb 3.2 oz)     Ht Readings from Last 1 Encounters:   07/11/24 174 cm (68.5\")     BMI Readings from Last 1 Encounters:   07/11/24 65.66 kg/m²        Initial Weight/Date: 438.2 lbs (July 2024)    Past Medical History:   Diagnosis Date    Anxiety     Depression     Essential hypertension     essential     Fatigue     Morbid obesity     Snoring     Somnolence, daytime       Past Surgical History:   Procedure Laterality Date    TONGUE SURGERY  02/2009      Current Outpatient Medications   Medication Sig Dispense Refill    carvedilol (COREG) 12.5 MG tablet Take 1 tablet by mouth 2 (Two) Times a Day.      lisinopril (PRINIVIL,ZESTRIL) 20 MG tablet Take 1 tablet by mouth Daily. for blood pressure      Ozempic, 0.25 or 0.5 MG/DOSE, 2 MG/3ML solution pen-injector INJECT 0.25 MG SUBCUTANEOUSLY ONCE A WEEK ON THE SAME DAY EACH WEEK FOR 4 WEEKS THEN INCREASE TO 0.5 MG SUBCUTANEOUSLY ONCE WEEKLY ON THE SAME DAY EACH WEEK       No current facility-administered medications for this visit.      No Known Allergies       Nutrition Intervention  Nutrition education and nutrition coaching for behavior change provided.  Strategies used included Comprehensive education & skill development for measuring portions, reading food labels, calculating protein, meal planning, understanding appropriate drink choices, and evaluating condiments, seasonings, and cooking methods.   Review of medical weight loss prescription plan and reviewed nutritional needs for Preoperative Bariatric Surgery.  Self-monitoring strategies such as keeping a food journal (on paper " or electronically) were discussed.  Recommended increasing physical activity, beyond normal daily habits, gradually working to reach ~30 minutes daily.     Recommended Diet Changes- Green Prescription Meal Plan. Provided Sample Menus  Eat 4 meals per day with protein and vegetables at each meal, no carbs after meal 2., Protein goal: 80 gms., Eat vegetables first at each meal., Discussed protein guidelines for shakes and bars., Reduce snacking -use foods from free foods list only., Reduce fat, sugar, and/or salt in food choices., Choose more nutrient dense foods., Choose foods with increased fiber., Monitor portion sizes using a food scale and/or measuring cup., Eliminate soda and sugar-sweetened beverages, and Increase fluid intake to 64 ounces per day       Monitoring/Evaluation Plan  Anticipate follow up in one month. Continue collaboration of care with physician and treatment team.     Time Spent 30 minutes    Electronically signed by  Val Fields RDN, LD  07/11/2024 12:57 CDT.

## 2024-07-11 NOTE — ASSESSMENT & PLAN NOTE
Hypertension is stable and controlled  Continue current treatment regimen.  Weight loss.  Blood pressure will be reassessed  with PCP, monitor levels at home .

## 2024-07-23 ENCOUNTER — NUTRITION (OUTPATIENT)
Dept: BARIATRICS/WEIGHT MGMT | Facility: CLINIC | Age: 29
End: 2024-07-23
Payer: MEDICAID

## 2024-07-23 VITALS — WEIGHT: 315 LBS | BODY MASS INDEX: 47.74 KG/M2 | HEIGHT: 68 IN

## 2024-07-23 DIAGNOSIS — E66.01 CLASS 3 SEVERE OBESITY DUE TO EXCESS CALORIES WITH SERIOUS COMORBIDITY AND BODY MASS INDEX (BMI) OF 60.0 TO 69.9 IN ADULT: Primary | ICD-10-CM

## 2024-07-23 PROCEDURE — 97803 MED NUTRITION INDIV SUBSEQ: CPT

## 2024-07-23 NOTE — PROGRESS NOTES
"Metabolic and Bariatric Surgery Adult Nutrition Assessment    Patient Name: Perez Choi   YOB: 1995   MRN: [id number]     Assessment Date:  07/23/2024     Reason for Visit: Initial Nutrition Assessment     Treatment Pathway: Preoperative Bariatric Surgery    Assessment    Anthropometrics   Wt Readings from Last 1 Encounters:   07/23/24 (!) 191 kg (420 lb 3.2 oz)     Ht Readings from Last 1 Encounters:   07/23/24 172.7 cm (68\")     BMI Readings from Last 1 Encounters:   07/23/24 63.89 kg/m²        Initial Weight/Date: 438 lbs (July 2024)  Weight Changes since last visit: -17.8 lbs  Net Weight Change: -17.8 lbs    Past Medical History:   Diagnosis Date    Anxiety     Depression     Essential hypertension     essential     Fatigue     Morbid obesity     Snoring     Somnolence, daytime       Past Surgical History:   Procedure Laterality Date    TONGUE SURGERY  02/2009      Current Outpatient Medications   Medication Sig Dispense Refill    carvedilol (COREG) 12.5 MG tablet Take 1 tablet by mouth 2 (Two) Times a Day.      lisinopril (PRINIVIL,ZESTRIL) 20 MG tablet Take 1 tablet by mouth Daily. for blood pressure      Ozempic, 0.25 or 0.5 MG/DOSE, 2 MG/3ML solution pen-injector INJECT 0.25 MG SUBCUTANEOUSLY ONCE A WEEK ON THE SAME DAY EACH WEEK FOR 4 WEEKS THEN INCREASE TO 0.5 MG SUBCUTANEOUSLY ONCE WEEKLY ON THE SAME DAY EACH WEEK       No current facility-administered medications for this visit.      No Known Allergies       Pertinent Social/Behavior/Environmental History: currently working [employer] (afternoons or mornings) Starts back to school full time in August.     Nutrition Recall  Eating 2-4 meals daily   Food journal reviewed.   Snacking - none  Monitoring portions- does have a food scale & Measuring cups; has been estimating   Calculating Protein- is using perfect protein packet to calculate   Drinking sugary/carbonated beverages- 1 diet orange soda in the past two weeks.   Fluid " Intake- 100 + oz water daily.       Success this Month: cognizant of portion sizes and more vegetables.   Barriers: is feeling overwhelmed. Limited vegetable preferences.     Exercise: some walking.     Nutrition Intervention  Nutrition education for morbid obesity. Nutrition coaching and intensive behavioral therapy for behavior change provided.  Strategies used included Comprehensive education, Motivational Interviewing , Problem Solving, Skill Development for meal planning, Ongoing reinforcement, and Provided sample menus  Review of medical weight loss prescription 4 meal/day plan and reviewed nutritional needs for Preoperative Bariatric Surgery.  Self-monitoring strategies such as keeping a food journal (on paper or electronically) and calculating fluid/protein intake were discussed.  Recommend increasing physical activity, beyond normal daily habits, gradually working to reach ~30 minutes daily.     Recommended Diet Changes- Yellow Prescription Meal Plan  Eat 4 meals per day with protein and vegetables at each meal, no carbs after meal 2., Protein goal: 65 gms., Eat vegetables first at each meal., Discussed protein guidelines for shakes and bars., Reduce snacking -use foods from free foods list only., Reduce fat, sugar, and/or salt in food choices., Choose more nutrient dense foods., Choose foods with increased fiber., Monitor portion sizes using a food scale and/or measuring cup., Eliminate soda and sugar-sweetened beverages, and Increase fluid intake to 64 ounces per day      Goals  1. Spend 10-15 minutes each night to preplan meals for the next day.   2. Increase to consistently have 4 meals/day.       Monitoring/Evaluation Plan  Anticipate follow in ~ 3 weeks. Continue collaboration of care with physician and treatment team.     Time Spent 15 minutes    Electronically signed by  Val Fields RDN, LD  07/23/2024 14:12 CDT.

## 2024-08-12 ENCOUNTER — NUTRITION (OUTPATIENT)
Dept: BARIATRICS/WEIGHT MGMT | Facility: CLINIC | Age: 29
End: 2024-08-12
Payer: MEDICAID

## 2024-08-12 ENCOUNTER — OFFICE VISIT (OUTPATIENT)
Dept: BARIATRICS/WEIGHT MGMT | Facility: CLINIC | Age: 29
End: 2024-08-12
Payer: MEDICAID

## 2024-08-12 VITALS
WEIGHT: 315 LBS | OXYGEN SATURATION: 97 % | TEMPERATURE: 98.4 F | HEIGHT: 68 IN | SYSTOLIC BLOOD PRESSURE: 138 MMHG | HEART RATE: 96 BPM | BODY MASS INDEX: 47.74 KG/M2 | DIASTOLIC BLOOD PRESSURE: 87 MMHG

## 2024-08-12 DIAGNOSIS — G47.10 HYPERSOMNIA: ICD-10-CM

## 2024-08-12 DIAGNOSIS — R73.03 PREDIABETES: ICD-10-CM

## 2024-08-12 DIAGNOSIS — I10 ESSENTIAL HYPERTENSION: ICD-10-CM

## 2024-08-12 DIAGNOSIS — F32.89 OTHER DEPRESSION: ICD-10-CM

## 2024-08-12 DIAGNOSIS — E66.01 CLASS 3 SEVERE OBESITY DUE TO EXCESS CALORIES WITH SERIOUS COMORBIDITY AND BODY MASS INDEX (BMI) OF 60.0 TO 69.9 IN ADULT: Primary | ICD-10-CM

## 2024-08-12 DIAGNOSIS — Z13.21 SCREENING FOR MALNUTRITION: ICD-10-CM

## 2024-08-12 DIAGNOSIS — R06.83 SNORING: ICD-10-CM

## 2024-08-12 RX ORDER — LISINOPRIL AND HYDROCHLOROTHIAZIDE 25; 20 MG/1; MG/1
1 TABLET ORAL DAILY
COMMUNITY
Start: 2024-07-09

## 2024-08-12 NOTE — PROGRESS NOTES
"Patient Care Team:  Mariangel Kennedy MD as PCP - General (Family Medicine)    Reason for Visit:  Surgical Weight Loss, V2       Subjective   History of Present Illness  Perez Choi is a 28 y.o. male. The patient is here today for his second visit.    He consulted with Val ravi, who discussed nutritional goals with him. He is making significant progress, but anticipates an increase in growth. He is planning to proceed with surgery. He has lost 31 pounds since his first appointment, which has helped him maintain his weight loss journey. His BMI is currently 61. He snores and occasionally experiences daytime fatigue. His sleep schedule is from 11:00 p.m. to 1:00 a.m. and wakes up between 8:00 a.m. and 9:30 a.m. He does not use nicotine. He suffers from depression. He avoids drinking sodas.      Review Of Systems:  Review of Systems   Constitutional: Negative.    Respiratory: Negative.     Cardiovascular: Negative.    Gastrointestinal: Negative.    Endocrine: Negative.    Musculoskeletal: Negative.    Psychiatric/Behavioral: Negative.           The following portions of the patient's history were reviewed and updated as appropriate: allergies, current medications, past family history, past medical history, past social history, past surgical history, and problem list.    Objective   /87 (BP Location: Right arm, Patient Position: Sitting, Cuff Size: Adult)   Pulse 96   Temp 98.4 °F (36.9 °C)   Ht 172.7 cm (68\")   Wt (!) 185 kg (407 lb)   SpO2 97%   BMI 61.88 kg/m²       08/12/24  1325   Weight: (!) 185 kg (407 lb)            Physical Exam  Vitals reviewed.   Constitutional:       Appearance: He is obese.   Cardiovascular:      Rate and Rhythm: Normal rate and regular rhythm.   Pulmonary:      Effort: Pulmonary effort is normal.   Abdominal:      General: Bowel sounds are normal.      Palpations: Abdomen is soft.   Musculoskeletal:         General: Normal range of motion.   Skin:     " General: Skin is warm and dry.   Neurological:      Mental Status: He is alert and oriented to person, place, and time.   Psychiatric:         Mood and Affect: Mood normal.         Behavior: Behavior normal.       Physical Exam  Vital Signs  BMI is 61.            Assessment & Plan   Diagnoses and all orders for this visit:    1. Class 3 severe obesity due to excess calories with serious comorbidity and body mass index (BMI) of 60.0 to 69.9 in adult (Primary)  -     CBC & Differential; Future  -     Comprehensive Metabolic Panel; Future  -     Zinc; Future  -     Folate; Future  -     Vitamin A & E; Future  -     Vitamin B12; Future  -     Vitamin D,25-Hydroxy; Future  -     Vitamin B6; Future  -     Iron; Future  -     ECG 12 Lead; Future  -     Ambulatory Referral to Psychiatry  -     Home Sleep Study; Future  -     TSH; Future  -     Hemoglobin A1c; Future  -     Vitamin B1, Whole Blood; Future    2. Essential hypertension  Overview:  essential     Orders:  -     ECG 12 Lead; Future    3. Prediabetes    4. Snoring  -     Home Sleep Study; Future    5. Hypersomnia  -     Home Sleep Study; Future    6. Screening for malnutrition  -     CBC & Differential; Future  -     Comprehensive Metabolic Panel; Future  -     Zinc; Future  -     Folate; Future  -     Vitamin A & E; Future  -     Vitamin B12; Future  -     Vitamin D,25-Hydroxy; Future  -     Vitamin B6; Future  -     Iron; Future  -     ECG 12 Lead; Future  -     Ambulatory Referral to Psychiatry  -     Home Sleep Study; Future  -     TSH; Future  -     Hemoglobin A1c; Future  -     Vitamin B1, Whole Blood; Future    7. Other depression  -     Ambulatory Referral to Psychiatry     Douglass Sleepiness Scale (ESS)  Rate situations associated with sleepiness:      Sitting and reading      No chance of dozing (0 points)   1  Slight chance of dozing (1 point)     Moderate chance of dozing (2 points)     High chance of dozing (3 points)   Watching television    0  No  chance of dozing (0 points)     Slight chance of dozing (1 point)     Moderate chance of dozing (2 points)     High chance of dozing (3 points)   Sitting inactive in a public place      No chance of dozing (0 points)     Slight chance of dozing (1 point)     Moderate chance of dozing (2 points)   3  High chance of dozing (3 points)   Sitting for an hour as a passenger in a car      No chance of dozing (0 points)   1  Slight chance of dozing (1 point)     Moderate chance of dozing (2 points)     High chance of dozing (3 points)   Lying down in the afternoon to rest      No chance of dozing (0 points)     Slight chance of dozing (1 point)     Moderate chance of dozing (2 points)   3  High chance of dozing (3 points)   Sitting and talking to another person    0  No chance of dozing (0 points)     Slight chance of dozing (1 point)     Moderate chance of dozing (2 points)     High chance of dozing (3 points)   Sitting quietly after a lunch (no alcohol at lunch)      No chance of dozing (0 points)   1  Slight chance of dozing (1 point)     Moderate chance of dozing (2 points)     High chance of dozing (3 points)   Sitting in a car, stopped for a few minutes due to traffic    0  No chance of dozing (0 points)     Slight chance of dozing (1 point)     Moderate chance of dozing (2 points)     High chance of dozing (3 points)         Total Criteria Point Count:8       Assessment & Plan  1. Preoperative evaluation.  A referral to psychiatry will be made today. A home sleep study will be ordered. Blood work and an EKG will be ordered. He will be contacted with the results. Post-surgery, he was advised to discuss his depression symptoms with his surgeon.    Follow-up  He will follow up in 1 month.  Perez Choi was seen today for follow-up, obesity, nutrition counseling and weight loss.    Today we discussed healthy changes in lifestyle, diet, and exercise. Dietician consultation obtained.  Perez Choi had  received handouts to him explaining the recommendation on portion sizes/appetite control/reading nutrition labels.   Intensive behavioral therapy for obesity was done today as well.       Follow up in 1 month for a weight recheck.    Patient will continue GREEN meal plan.     I spent 30 minutes caring for Perez on this date of service. This time includes time spent by me in the following activities: preparing for the visit, reviewing tests, obtaining and/or reviewing a separately obtained history, performing a medically appropriate examination and/or evaluation, counseling and educating the patient/family/caregiver, ordering medications, tests, or procedures, documenting information in the medical record and care coordination; discussing further testing.  Patient or patient representative verbalized consent for the use of Ambient Listening during the visit with  ADDI Cagle for chart documentation. 8/15/2024  14:06 CDT

## 2024-08-12 NOTE — PROGRESS NOTES
"Metabolic and Bariatric Surgery Adult Nutrition Assessment    Patient Name: Perez Choi   YOB: 1995   MRN: [id number]     Assessment Date:  08/12/2024     Reason for Visit: Follow-up Nutrition Assessment     Treatment Pathway: Preoperative Bariatric Surgery    Assessment    Anthropometrics   Wt Readings from Last 1 Encounters:   08/12/24 (!) 185 kg (407 lb)     Ht Readings from Last 1 Encounters:   08/12/24 172.7 cm (68\")     BMI Readings from Last 1 Encounters:   08/12/24 61.88 kg/m²        Initial Weight/Date: 438 lbs (July 2024)  Weight Changes since last visit: -13.2 lbs  Net Weight Change: -31 lbs    Past Medical History:   Diagnosis Date    Anxiety     Depression     Essential hypertension     essential     Fatigue     Morbid obesity     Snoring     Somnolence, daytime       Past Surgical History:   Procedure Laterality Date    TONGUE SURGERY  02/2009      Current Outpatient Medications   Medication Sig Dispense Refill    carvedilol (COREG) 12.5 MG tablet Take 1 tablet by mouth 2 (Two) Times a Day.      lisinopril-hydrochlorothiazide (PRINZIDE,ZESTORETIC) 20-25 MG per tablet Take 1 tablet by mouth Daily. for blood pressure      Ozempic, 0.25 or 0.5 MG/DOSE, 2 MG/3ML solution pen-injector INJECT 0.25 MG SUBCUTANEOUSLY ONCE A WEEK ON THE SAME DAY EACH WEEK FOR 4 WEEKS THEN INCREASE TO 0.5 MG SUBCUTANEOUSLY ONCE WEEKLY ON THE SAME DAY EACH WEEK       No current facility-administered medications for this visit.      No Known Allergies       Motivation for weight loss includes: Desires to feel better.     Pertinent Social/Behavior/Environmental History: up between 8/9 am/ goes to bed between 11pm/1am.     Nutrition Recall  Eating 1 to 2 meals daily. Hasn't kept a food journal in the past 2 weeks.   (M1) usually first meal is usually around 4-5 pm. Chicken/fish with vegetables.   N/a. Sometimes using equate protein drink.   Snacking - none  Monitoring portions- estimating.   Calculating " "Protein- not   Drinking sugary/carbonated beverages- none  Fluid Intake- ~120 ounces.      Success this Month: hasn't been having fast food- driven by the desire to feel better.   Does feel comfortable cooking and grocery shopping.   Barriers: depression drives food choices. Meal times and eating are driven by when \"wanting.\"  Afraid of overeating if eating 4 meals.     Exercise: walking ~ 7000 steps daily.     Nutrition Intervention  Nutrition education for morbid obesity. Nutrition coaching and intensive behavioral therapy for behavior change provided.  Strategies used included Comprehensive education, Motivational Interviewing , Problem Solving, Skill Development for meal planning, and Ongoing reinforcement  Review of medical weight loss prescription 4 meal/day plan and reviewed nutritional needs for Preoperative Bariatric Surgery.  Self-monitoring strategies such as keeping a food journal (on paper or electronically) and calculating fluid/protein intake were discussed.  Recommend increasing physical activity, beyond normal daily habits, gradually working to reach ~30 minutes daily.     Recommended Diet Changes- Green Prescription Meal Plan  Eat 4 meals per day with protein and vegetables at each meal, no carbs after meal 2., Protein goal: 80 gms., Eat vegetables first at each meal., Discussed protein guidelines for shakes and bars., Reduce snacking -use foods from free foods list only., Reduce fat, sugar, and/or salt in food choices., Choose more nutrient dense foods., Choose foods with increased fiber., Monitor portion sizes using a food scale and/or measuring cup., Eliminate soda and sugar-sweetened beverages, and Increase fluid intake to 64 ounces per day      Goals  1. Meal 1 by 10 am. Meal 2 by 1 pm. Meal 3 between 4 to 5 pm. Meal 4 between 8 and 10 pm.   2. Consistently measure portions with measuring cups.       Monitoring/Evaluation Plan  Anticipate follow in 1 months. Continue collaboration of care with " physician and treatment team.     Time Spent 15 minutes.     Electronically signed by  Val Fields RDN, LD  08/12/2024 13:36 CDT.

## 2024-09-04 ENCOUNTER — LAB (OUTPATIENT)
Dept: LAB | Facility: HOSPITAL | Age: 29
End: 2024-09-04
Payer: MEDICAID

## 2024-09-04 ENCOUNTER — HOSPITAL ENCOUNTER (OUTPATIENT)
Dept: CARDIOLOGY | Facility: HOSPITAL | Age: 29
Discharge: HOME | End: 2024-09-04
Payer: MEDICAID

## 2024-09-04 DIAGNOSIS — E66.01 CLASS 3 SEVERE OBESITY DUE TO EXCESS CALORIES WITH SERIOUS COMORBIDITY AND BODY MASS INDEX (BMI) OF 60.0 TO 69.9 IN ADULT: ICD-10-CM

## 2024-09-04 DIAGNOSIS — Z13.21 SCREENING FOR MALNUTRITION: ICD-10-CM

## 2024-09-04 DIAGNOSIS — I10 ESSENTIAL HYPERTENSION: ICD-10-CM

## 2024-09-04 LAB
25(OH)D3 SERPL-MCNC: 16.6 NG/ML (ref 30–100)
ALBUMIN SERPL-MCNC: 4.2 G/DL (ref 3.5–5.2)
ALBUMIN/GLOB SERPL: 1.3 G/DL
ALP SERPL-CCNC: 99 U/L (ref 39–117)
ALT SERPL W P-5'-P-CCNC: 38 U/L (ref 1–41)
ANION GAP SERPL CALCULATED.3IONS-SCNC: 10 MMOL/L (ref 5–15)
AST SERPL-CCNC: 22 U/L (ref 1–40)
BASOPHILS # BLD AUTO: 0.04 10*3/MM3 (ref 0–0.2)
BASOPHILS NFR BLD AUTO: 0.7 % (ref 0–1.5)
BILIRUB SERPL-MCNC: 0.5 MG/DL (ref 0–1.2)
BUN SERPL-MCNC: 7 MG/DL (ref 6–20)
BUN/CREAT SERPL: 9.9 (ref 7–25)
CALCIUM SPEC-SCNC: 9.3 MG/DL (ref 8.6–10.5)
CHLORIDE SERPL-SCNC: 103 MMOL/L (ref 98–107)
CO2 SERPL-SCNC: 27 MMOL/L (ref 22–29)
CREAT SERPL-MCNC: 0.71 MG/DL (ref 0.76–1.27)
DEPRECATED RDW RBC AUTO: 38.5 FL (ref 37–54)
EGFRCR SERPLBLD CKD-EPI 2021: 127.4 ML/MIN/1.73
EOSINOPHIL # BLD AUTO: 0.1 10*3/MM3 (ref 0–0.4)
EOSINOPHIL NFR BLD AUTO: 1.8 % (ref 0.3–6.2)
ERYTHROCYTE [DISTWIDTH] IN BLOOD BY AUTOMATED COUNT: 12.8 % (ref 12.3–15.4)
FOLATE SERPL-MCNC: 7.48 NG/ML (ref 4.78–24.2)
GLOBULIN UR ELPH-MCNC: 3.2 GM/DL
GLUCOSE SERPL-MCNC: 97 MG/DL (ref 65–99)
HBA1C MFR BLD: 5.3 % (ref 4.8–5.6)
HCT VFR BLD AUTO: 43.3 % (ref 37.5–51)
HGB BLD-MCNC: 14.4 G/DL (ref 13–17.7)
IMM GRANULOCYTES # BLD AUTO: 0.01 10*3/MM3 (ref 0–0.05)
IMM GRANULOCYTES NFR BLD AUTO: 0.2 % (ref 0–0.5)
IRON 24H UR-MRATE: 50 MCG/DL (ref 59–158)
LYMPHOCYTES # BLD AUTO: 1.7 10*3/MM3 (ref 0.7–3.1)
LYMPHOCYTES NFR BLD AUTO: 31.2 % (ref 19.6–45.3)
MCH RBC QN AUTO: 27.4 PG (ref 26.6–33)
MCHC RBC AUTO-ENTMCNC: 33.3 G/DL (ref 31.5–35.7)
MCV RBC AUTO: 82.5 FL (ref 79–97)
MONOCYTES # BLD AUTO: 0.51 10*3/MM3 (ref 0.1–0.9)
MONOCYTES NFR BLD AUTO: 9.4 % (ref 5–12)
NEUTROPHILS NFR BLD AUTO: 3.09 10*3/MM3 (ref 1.7–7)
NEUTROPHILS NFR BLD AUTO: 56.7 % (ref 42.7–76)
NRBC BLD AUTO-RTO: 0 /100 WBC (ref 0–0.2)
PLATELET # BLD AUTO: 264 10*3/MM3 (ref 140–450)
PMV BLD AUTO: 9.3 FL (ref 6–12)
POTASSIUM SERPL-SCNC: 4.4 MMOL/L (ref 3.5–5.2)
PROT SERPL-MCNC: 7.4 G/DL (ref 6–8.5)
RBC # BLD AUTO: 5.25 10*6/MM3 (ref 4.14–5.8)
SODIUM SERPL-SCNC: 140 MMOL/L (ref 136–145)
TSH SERPL DL<=0.05 MIU/L-ACNC: 2.2 UIU/ML (ref 0.27–4.2)
VIT B12 BLD-MCNC: 356 PG/ML (ref 211–946)
WBC NRBC COR # BLD AUTO: 5.45 10*3/MM3 (ref 3.4–10.8)

## 2024-09-04 PROCEDURE — 84207 ASSAY OF VITAMIN B-6: CPT

## 2024-09-04 PROCEDURE — 84630 ASSAY OF ZINC: CPT

## 2024-09-04 PROCEDURE — 83036 HEMOGLOBIN GLYCOSYLATED A1C: CPT

## 2024-09-04 PROCEDURE — 93005 ELECTROCARDIOGRAM TRACING: CPT | Performed by: NURSE PRACTITIONER

## 2024-09-04 PROCEDURE — 82306 VITAMIN D 25 HYDROXY: CPT

## 2024-09-04 PROCEDURE — 36415 COLL VENOUS BLD VENIPUNCTURE: CPT

## 2024-09-04 PROCEDURE — 84425 ASSAY OF VITAMIN B-1: CPT

## 2024-09-04 PROCEDURE — 80050 GENERAL HEALTH PANEL: CPT

## 2024-09-04 PROCEDURE — 84590 ASSAY OF VITAMIN A: CPT

## 2024-09-04 PROCEDURE — 82607 VITAMIN B-12: CPT

## 2024-09-04 PROCEDURE — 83540 ASSAY OF IRON: CPT

## 2024-09-04 PROCEDURE — 84446 ASSAY OF VITAMIN E: CPT

## 2024-09-04 PROCEDURE — 82746 ASSAY OF FOLIC ACID SERUM: CPT

## 2024-09-05 LAB
QT INTERVAL: 368 MS
QTC INTERVAL: 427 MS

## 2024-09-06 LAB — PYRIDOXAL PHOS SERPL-MCNC: 6.6 UG/L (ref 3.4–65.2)

## 2024-09-07 LAB
VIT B1 BLD-SCNC: 100.7 NMOL/L (ref 66.5–200)
ZINC SERPL-MCNC: 74 UG/DL (ref 44–115)

## 2024-09-10 ENCOUNTER — OFFICE VISIT (OUTPATIENT)
Dept: BARIATRICS/WEIGHT MGMT | Facility: CLINIC | Age: 29
End: 2024-09-10
Payer: MEDICAID

## 2024-09-10 VITALS
SYSTOLIC BLOOD PRESSURE: 153 MMHG | HEART RATE: 86 BPM | HEIGHT: 68 IN | BODY MASS INDEX: 47.74 KG/M2 | WEIGHT: 315 LBS | OXYGEN SATURATION: 98 % | TEMPERATURE: 98.6 F | DIASTOLIC BLOOD PRESSURE: 97 MMHG

## 2024-09-10 DIAGNOSIS — I10 ESSENTIAL HYPERTENSION: ICD-10-CM

## 2024-09-10 DIAGNOSIS — R10.13 DYSPEPSIA: ICD-10-CM

## 2024-09-10 DIAGNOSIS — E66.01 CLASS 3 SEVERE OBESITY DUE TO EXCESS CALORIES WITH SERIOUS COMORBIDITY AND BODY MASS INDEX (BMI) OF 60.0 TO 69.9 IN ADULT: Primary | ICD-10-CM

## 2024-09-10 LAB
A-TOCOPHEROL VIT E SERPL-MCNC: 6.7 MG/L (ref 5.9–19.4)
GAMMA TOCOPHEROL SERPL-MCNC: 4.4 MG/L (ref 0.7–4.9)
VIT A SERPL-MCNC: 46.8 UG/DL (ref 18.9–57.3)

## 2024-09-10 PROCEDURE — 3080F DIAST BP >= 90 MM HG: CPT | Performed by: SURGERY

## 2024-09-10 PROCEDURE — 99214 OFFICE O/P EST MOD 30 MIN: CPT | Performed by: SURGERY

## 2024-09-10 PROCEDURE — 3077F SYST BP >= 140 MM HG: CPT | Performed by: SURGERY

## 2024-09-10 PROCEDURE — 1160F RVW MEDS BY RX/DR IN RCRD: CPT | Performed by: SURGERY

## 2024-09-10 PROCEDURE — 1159F MED LIST DOCD IN RCRD: CPT | Performed by: SURGERY

## 2024-09-10 RX ORDER — IBUPROFEN 200 MG
200 TABLET ORAL EVERY 6 HOURS PRN
COMMUNITY

## 2024-09-10 NOTE — PROGRESS NOTES
Patient Care Team:  Josh Guillermo Jr., MD as PCP - General (Family Medicine)    Reason for Visit:  Surgical Weight loss      Subjective     Perez Choi is a pleasant 29 y.o. male and presents with morbid obesity with his Body mass index is 60.27 kg/m².    He is here for discussion of the upper endoscopic procedure.  He stated he has been with the disease of obesity for year(s).  He stated he suffers from dyspepsia with certain foods and morbid obesity due to his weight gain.  He stated that weight loss helps alleviate these symptoms.   He stated that he has tried diets to help with weight loss.  He stated that he has attempted these conservative methods for weight loss without maintaining long term success.  Today he and myself will discuss surgical weight loss options such as the Laparoscopic Sleeve Gastrectomy or the Laparoscopic R - Y Gastric Bypass.        Review of Systems  General ROS: negative  Respiratory ROS: no cough, shortness of breath, or wheezing  Cardiovascular ROS: no chest pain or dyspnea on exertion  Gastrointestinal ROS: no abdominal pain, change in bowel habits, or black or bloody stools  positive for - gas/bloating    History  Past Medical History:   Diagnosis Date    Anxiety     Depression     Essential hypertension     essential     Fatigue     Morbid obesity     Snoring     Somnolence, daytime      Past Surgical History:   Procedure Laterality Date    TONGUE SURGERY  02/2009     Family History   Problem Relation Age of Onset    Hypertension Mother     Obesity Mother     Hypertension Father     Obesity Father     Stroke Maternal Grandmother     Cancer Maternal Grandfather     Heart disease Maternal Grandfather     Diabetes Maternal Grandfather     Hypertension Maternal Grandfather     Stroke Maternal Grandfather     Obesity Maternal Grandfather     Arthritis Paternal Grandmother     Hypertension Paternal Grandmother     Cancer Paternal Grandfather     Heart disease Paternal  Grandfather     Hypertension Paternal Grandfather     Obesity Paternal Grandfather      Social History     Tobacco Use    Smoking status: Never    Smokeless tobacco: Never   Vaping Use    Vaping status: Never Used   Substance Use Topics    Alcohol use: No    Drug use: No     E-cigarette/Vaping    E-cigarette/Vaping Use Never User      E-cigarette/Vaping Substances     (Not in a hospital admission)   Allergies:  Patient has no known allergies.      Current Outpatient Medications:     carvedilol (COREG) 12.5 MG tablet, Take 1 tablet by mouth 2 (Two) Times a Day., Disp: , Rfl:     ibuprofen (ADVIL,MOTRIN) 200 MG tablet, Take 1 tablet by mouth Every 6 (Six) Hours As Needed for Mild Pain., Disp: , Rfl:     lisinopril-hydrochlorothiazide (PRINZIDE,ZESTORETIC) 20-25 MG per tablet, Take 1 tablet by mouth Daily. for blood pressure, Disp: , Rfl:     Ozempic, 0.25 or 0.5 MG/DOSE, 2 MG/3ML solution pen-injector, INJECT 0.25 MG SUBCUTANEOUSLY ONCE A WEEK ON THE SAME DAY EACH WEEK FOR 4 WEEKS THEN INCREASE TO 0.5 MG SUBCUTANEOUSLY ONCE WEEKLY ON THE SAME DAY EACH WEEK, Disp: , Rfl:     Objective     Vital Signs  Temp:  [98.6 °F (37 °C)] 98.6 °F (37 °C)  Heart Rate:  [86] 86  BP: (153)/(97) 153/97  Body mass index is 60.27 kg/m².      09/10/24  1410   Weight: (!) 180 kg (396 lb 6.4 oz)       Physical Exam:     HEENT: extra ocular movement intact and sclera clear, anicteric  Respiratory: appears well, vitals normal, no respiratory distress, acyanotic, normal RR, chest clear, no wheezing, crepitations, rhonchi, normal symmetric air entry  Cardiovascular: regular rate and rhythm  GI: Soft, non-tender, normal bowel sounds; no bruits, organomegaly or masses.  Abnormal shape: obese  Musculoskeletal: inspection - no abnormality  Neurologic: alert, oriented, normal speech, no focal findings or movement disorder noted       Results Review:   I reviewed the patient's new clinical results.        Assessment & Plan   Encounter Diagnoses    Name Primary?    Class 3 severe obesity due to excess calories with serious comorbidity and body mass index (BMI) of 60.0 to 69.9 in adult Yes    Essential hypertension     Dyspepsia            1.  I believe this patient will be a good candidate for weight loss surgery.  I have discussed the Yoly - Y Gastric Bypass, laparoscopic sleeve gastrectomy and the Laparoscopic Gastric Band procedures.  We discussed the benefits of the surgeries including the benefit of weight loss and the possible reversal of co-morbid conditions associated with morbid obesity. I explained to the patient that prior to making a definitive decision on the type of surgery he will require an esophagogastroduodenoscopy with biopsies to assess for any contraindications for surgical weight loss.  I explained the alternatives  include not doing anything, or pursuing an UGI series which only offers a diagnosis with potential less accuracy compared to EGD, the benefits of the EGD such as identifying the pathology and anatomy of the upper GI system, and the risks and complications of the endoscopy were discussed in detail as well. I discussed the risk of perforation (one out of 3117-6211, riskier with dilation), bleeding (one out of 500), and the rare risks of infection, adverse reaction to anesthesia, respiratory failure, cardiac failure including MI and adverse reaction to medications such as allergic reactions. We discussed consequences that could occur if a risk were to develop such as the need for hospitalization, blood transfusion, surgical intervention, medications, pain, disability and death. The patient verbalizes understanding and agrees to proceed. such as bleeding, perforation, swallowing difficulties and gas bloat can occur after this procedure.    Upon completion of our discussion and addressing and answering his questions to his satisfation, informed consent was obtained.  He will be scheduled accordingly for the  esophagogastroduodenoscopy procedure.    I discussed the patients findings and my recommendations with patient.     Dr. Timbo Corrigan MD [facility]    09/10/24  15:58 CDT  Patient Care Team:  Josh Guillermo Jr., MD as PCP - General (Family Medicine)

## 2024-09-13 ENCOUNTER — TELEPHONE (OUTPATIENT)
Dept: BARIATRICS/WEIGHT MGMT | Facility: CLINIC | Age: 29
End: 2024-09-13
Payer: MEDICAID

## 2024-09-13 NOTE — TELEPHONE ENCOUNTER
Patient was agreeable to reschedule.    LVM for the patient: We need to reschedule for his BA EGD from Oct 18th to Oct 25th due to a scheduling conflict.

## 2024-09-23 RX ORDER — ERGOCALCIFEROL 1.25 MG/1
50000 CAPSULE, LIQUID FILLED ORAL WEEKLY
Qty: 12 CAPSULE | Refills: 1 | Status: SHIPPED | OUTPATIENT
Start: 2024-09-23

## 2024-10-15 ENCOUNTER — OFFICE VISIT (OUTPATIENT)
Dept: BARIATRICS/WEIGHT MGMT | Facility: CLINIC | Age: 29
End: 2024-10-15
Payer: MEDICAID

## 2024-10-15 VITALS
SYSTOLIC BLOOD PRESSURE: 139 MMHG | TEMPERATURE: 98.7 F | HEIGHT: 68 IN | OXYGEN SATURATION: 98 % | BODY MASS INDEX: 47.74 KG/M2 | WEIGHT: 315 LBS | DIASTOLIC BLOOD PRESSURE: 81 MMHG | HEART RATE: 79 BPM

## 2024-10-15 DIAGNOSIS — E66.813 CLASS 3 SEVERE OBESITY DUE TO EXCESS CALORIES WITH SERIOUS COMORBIDITY AND BODY MASS INDEX (BMI) OF 50.0 TO 59.9 IN ADULT: Primary | ICD-10-CM

## 2024-10-15 DIAGNOSIS — E66.01 CLASS 3 SEVERE OBESITY DUE TO EXCESS CALORIES WITH SERIOUS COMORBIDITY AND BODY MASS INDEX (BMI) OF 50.0 TO 59.9 IN ADULT: Primary | ICD-10-CM

## 2024-10-15 DIAGNOSIS — I10 ESSENTIAL HYPERTENSION: ICD-10-CM

## 2024-10-15 NOTE — PROGRESS NOTES
"Patient Care Team:  Josh Guillermo Jr., MD as PCP - General (Family Medicine)    Reason for Visit:  Surgical Weight Loss      Subjective     History of Present Illness  The patient is a 29-year-old male who presents for a follow-up.    He reports experiencing anxiety attacks, which have intensified over the past two weeks. These episodes occur both in public and at home, with no identifiable triggers. Despite a referral to psychiatry, he has not yet attended any sessions. He admits to stress eating during periods of heightened anxiety.    He maintains a diet of two meals per day, typically lunch and dinner, and does not consume breakfast due to lack of appetite. His fluid intake is approximately 64 ounces daily, and he abstains from soda.    He is currently on Ozempic and reports positive results.    He also mentions undergoing a sleep study and requests an update on the results.      Review Of Systems:  Review of Systems   Constitutional:  Positive for fatigue.   Respiratory: Negative.     Cardiovascular: Negative.    Gastrointestinal: Negative.    Endocrine: Negative.    Musculoskeletal: Negative.    Psychiatric/Behavioral:  Positive for sleep disturbance and depressed mood. The patient is nervous/anxious.          The following portions of the patient's history were reviewed and updated as appropriate: allergies, current medications, past family history, past medical history, past social history, past surgical history, and problem list    Objective   /81 (BP Location: Right arm, Patient Position: Sitting, Cuff Size: Adult)   Pulse 79   Temp 98.7 °F (37.1 °C) (Temporal)   Ht 172.7 cm (68\")   Wt (!) 176 kg (388 lb 12.8 oz)   SpO2 98%   BMI 59.12 kg/m²       10/15/24  1350   Weight: (!) 176 kg (388 lb 12.8 oz)            Physical Exam  Vitals reviewed.   Constitutional:       Appearance: He is obese.   Cardiovascular:      Rate and Rhythm: Normal rate and regular rhythm.   Pulmonary:      Effort: " Pulmonary effort is normal.   Abdominal:      General: Bowel sounds are normal.      Palpations: Abdomen is soft.   Musculoskeletal:         General: Normal range of motion.   Skin:     General: Skin is warm and dry.   Neurological:      Mental Status: He is alert and oriented to person, place, and time.   Psychiatric:         Mood and Affect: Mood normal.         Behavior: Behavior normal.         Physical Exam  Vital Signs  BMI is 59.12.    Results  Testing  Sleep study results were negative for sleep apnea.               Assessment & Plan   Diagnoses and all orders for this visit:    1. Class 3 severe obesity due to excess calories with serious comorbidity and body mass index (BMI) of 50.0 to 59.9 in adult (Primary)  Assessment & Plan:  Patient's (Body mass index is 59.12 kg/m².) indicates that they are morbidly/severely obese (BMI > 40 or > 35 with obesity - related health condition) with health conditions that include hypertension . Weight is improving with treatment. BMI  is above average; BMI management plan is completed. We discussed portion control and increasing exercise.       2. Essential hypertension  Overview:  essential            Assessment & Plan  1. Anxiety.  He reports worsening anxiety attacks over the past month, especially in the last two weeks, with no specific triggers identified. He has not yet seen psychiatry for clearance despite a referral placed a couple of months ago. Follow-up with psychiatry is crucial for clearance and to help with coping mechanisms. He is advised to see psychiatry soon for further evaluation and to develop a plan for post-surgery follow-up care. Stress eating is noted as a coping mechanism, which should be addressed with a professional to prevent post-surgery complications.    2. Weight Management.  His weight has decreased by 8 pounds since the last visit, bringing his BMI to 59.12. This is a positive development towards his surgical goal. He is currently eating  two meals a day and finds it overwhelming to increase to four meals. He is advised to eat within 90 minutes of waking up and to gradually increase to four meals a day. Suggestions include small morning meals like protein shakes or smoothies. He should also document his dietary intake to help establish a routine.    3. Sleep Study.  The sleep study results were negative for sleep apnea, indicating no need for CPAP.    4. Medication Management.  He is currently on Ozempic and reports doing well with it. His providers are refilling this medication, and no additional prescription is needed.    Follow-up  Return for follow-up with Dr. Corrigan.    Perez Choi was seen today for follow-up, obesity, nutrition counseling and weight loss.    Today we discussed healthy changes in lifestyle, diet, and exercise. Dietician consultation obtained.  Perez Choi had received handouts to him explaining the recommendation on portion sizes/appetite control/reading nutrition labels.   Intensive behavioral therapy for obesity was done today as well.       Follow up in 1 month for a weight recheck.    Patient will continue GREEN meal plan.     ADDI Cagle   11:29 CDT  10/17/24        Patient or patient representative verbalized consent for the use of Ambient Listening during the visit with  ADDI Cagle for chart documentation. 10/17/2024  14:18 CDT

## 2024-10-17 NOTE — ASSESSMENT & PLAN NOTE
Patient's (Body mass index is 59.12 kg/m².) indicates that they are morbidly/severely obese (BMI > 40 or > 35 with obesity - related health condition) with health conditions that include hypertension . Weight is improving with treatment. BMI  is above average; BMI management plan is completed. We discussed portion control and increasing exercise.

## 2024-10-23 ENCOUNTER — TELEPHONE (OUTPATIENT)
Dept: BARIATRICS/WEIGHT MGMT | Facility: CLINIC | Age: 29
End: 2024-10-23
Payer: MEDICAID

## 2024-10-23 NOTE — TELEPHONE ENCOUNTER
BA-EGD LVM      Patient was called and reminded of their procedure with Dr Corrigan on        Instructions:     Eat normal the day before your procedure until 8 p.m. in the evening.    Beginning at 6pm clear liquids only-no Red or Pink in Color   Nothing to eat or drink after midnight.  Bring a list of medications.   Advised that they must bring a  as that IV sedation is being used.           The patient voiced an understanding and was agreeable.

## 2025-08-20 ENCOUNTER — TELEPHONE (OUTPATIENT)
Dept: GASTROENTEROLOGY | Facility: CLINIC | Age: 30
End: 2025-08-20
Payer: MEDICAID